# Patient Record
Sex: MALE | Race: WHITE | Employment: FULL TIME | URBAN - METROPOLITAN AREA
[De-identification: names, ages, dates, MRNs, and addresses within clinical notes are randomized per-mention and may not be internally consistent; named-entity substitution may affect disease eponyms.]

---

## 2014-05-01 LAB — COLONOSCOPY, EXTERNAL: NORMAL

## 2019-04-25 LAB — MICROALBUMIN UR TEST STR-MCNC: NORMAL MG/DL

## 2020-01-21 LAB — PSA, EXTERNAL: 2.2

## 2020-02-05 LAB
CREATININE, EXTERNAL: 1.12
HBA1C MFR BLD HPLC: 7.3 %
LDL-C, EXTERNAL: 95

## 2020-08-22 RX ORDER — ATORVASTATIN CALCIUM 10 MG/1
TABLET, FILM COATED ORAL
Qty: 90 TAB | Refills: 0 | Status: SHIPPED | OUTPATIENT
Start: 2020-08-22 | End: 2020-11-20

## 2020-08-22 NOTE — TELEPHONE ENCOUNTER
He was going to stop by in May for an A1c (non fasting) and was going to bring a copy of labs from his work physical.  Please set time for that nurse visit.

## 2020-09-01 DIAGNOSIS — E11.65 TYPE 2 DIABETES MELLITUS WITH HYPERGLYCEMIA, WITHOUT LONG-TERM CURRENT USE OF INSULIN (HCC): ICD-10-CM

## 2020-09-01 DIAGNOSIS — Z01.89 ROUTINE LAB DRAW: Primary | ICD-10-CM

## 2020-09-03 LAB
EST. AVERAGE GLUCOSE BLD GHB EST-MCNC: 140 MG/DL
HBA1C MFR BLD: 6.5 % (ref 4.8–5.6)

## 2020-10-21 RX ORDER — METFORMIN HYDROCHLORIDE 500 MG/1
TABLET, EXTENDED RELEASE ORAL
Qty: 270 TAB | Refills: 0 | Status: SHIPPED | OUTPATIENT
Start: 2020-10-21 | End: 2020-12-31

## 2020-10-22 VITALS
SYSTOLIC BLOOD PRESSURE: 118 MMHG | HEART RATE: 94 BPM | OXYGEN SATURATION: 96 % | DIASTOLIC BLOOD PRESSURE: 80 MMHG | WEIGHT: 162 LBS | BODY MASS INDEX: 24.55 KG/M2 | HEIGHT: 68 IN | RESPIRATION RATE: 12 BRPM | TEMPERATURE: 97.7 F

## 2020-10-22 RX ORDER — DULAGLUTIDE 0.75 MG/.5ML
0.75 INJECTION, SOLUTION SUBCUTANEOUS
COMMUNITY
End: 2020-11-26

## 2020-10-22 RX ORDER — BLOOD SUGAR DIAGNOSTIC
STRIP MISCELLANEOUS SEE ADMIN INSTRUCTIONS
COMMUNITY

## 2020-10-22 RX ORDER — ASPIRIN 81 MG/1
TABLET ORAL DAILY
COMMUNITY

## 2020-11-10 ENCOUNTER — VIRTUAL VISIT (OUTPATIENT)
Dept: FAMILY MEDICINE CLINIC | Age: 60
End: 2020-11-10
Payer: COMMERCIAL

## 2020-11-10 VITALS
OXYGEN SATURATION: 96 % | WEIGHT: 162 LBS | HEIGHT: 68 IN | RESPIRATION RATE: 12 BRPM | HEART RATE: 94 BPM | TEMPERATURE: 97.7 F | DIASTOLIC BLOOD PRESSURE: 80 MMHG | BODY MASS INDEX: 24.55 KG/M2 | SYSTOLIC BLOOD PRESSURE: 118 MMHG

## 2020-11-10 DIAGNOSIS — G47.09 OTHER INSOMNIA: ICD-10-CM

## 2020-11-10 DIAGNOSIS — E11.9 TYPE 2 DIABETES MELLITUS WITHOUT COMPLICATION, WITHOUT LONG-TERM CURRENT USE OF INSULIN (HCC): Primary | ICD-10-CM

## 2020-11-10 DIAGNOSIS — E78.2 MIXED HYPERLIPIDEMIA: ICD-10-CM

## 2020-11-10 PROCEDURE — 99214 OFFICE O/P EST MOD 30 MIN: CPT | Performed by: FAMILY MEDICINE

## 2020-11-10 NOTE — PROGRESS NOTES
Consent: Frankie Humphrey, who was seen by synchronous (real-time) audio-video technology, and/or his healthcare decision maker, is aware that this patient-initiated, Telehealth encounter on 11/10/2020 is a billable service, with coverage as determined by his insurance carrier. He is aware that he may receive a bill and has provided verbal consent to proceed: YES-Consent obtained within past 12 months        712  Subjective:   Frankie Humphrey is a 63 y.o. male who was seen for Follow-up (DM)      Patient is on the schedule today to follow-up on diabetes. He stopped by in September to have his A1c drawn and it was at 6.5%. He never did drop off of the results from his cholesterol testing that was done in March 2020 at his work physical.  He does report that he is exercising regularly. Home sugar readings are around 130150s. He denies any acute complaints today but continues to have problems with staying asleep at night. He has had these issues for many years. He was hoping that once he was on his CPAP machine that that would help him stay asleep longer but he has not found that to be the case. He falls asleep very easily but wakes up 3 to 4 hours later and has trouble falling back to sleep once his thoughts get churning. He has tried multiple over-the-counter medications. The only one that has been of any benefit is taking ZzzQuil when he wakes up during the night. He was put on trazodone in the past but had minimal benefit with that as well. Review of Systems   Constitutional: Negative for chills, fever and malaise/fatigue. Respiratory: Negative for cough, shortness of breath and wheezing. Cardiovascular: Positive for palpitations (Chronic. Unchanged. ). Negative for chest pain and leg swelling. Gastrointestinal: Negative for diarrhea and vomiting. Genitourinary: Negative for dysuria. Musculoskeletal: Negative for falls. Skin: Negative for rash.    Neurological: Negative for dizziness, tingling and weakness. Psychiatric/Behavioral: Negative for depression. The patient is not nervous/anxious. Prior to Admission medications    Medication Sig Start Date End Date Taking? Authorizing Provider   aspirin delayed-release 81 mg tablet Take  by mouth daily. Yes Provider, Historical   glucose blood VI test strips (OneTouch Verio test strips) strip by Does Not Apply route See Admin Instructions. Yes Provider, Historical   dulaglutide (Trulicity) 6.98 EV/6.5 mL sub-q pen 0.75 mg by SubCUTAneous route every seven (7) days. Yes Provider, Historical   metFORMIN ER (GLUCOPHAGE XR) 500 mg tablet TAKE 3 TABLETS BY MOUTH DAILY 10/21/20  Yes Lidia Grubbs MD   atorvastatin (LIPITOR) 10 mg tablet TAKE 1 TABLET BY MOUTH EVERY DAY AT BEDTIME 8/22/20  Yes Lidia Grubbs MD     No Known Allergies  Patient Active Problem List    Diagnosis    GERD (gastroesophageal reflux disease)    Basal cell carcinoma    Insomnia    Mixed hyperlipidemia    Obstructive sleep apnea    Type 2 diabetes mellitus without complication, without long-term current use of insulin ([facility] [address]       Objective:   Vital Signs: (As obtained by patient/caregiver at home)  There were no vitals taken for this visit.      [INSTRUCTIONS:  \"[x]\" Indicates a positive item  \"[]\" Indicates a negative item  -- DELETE ALL ITEMS NOT EXAMINED]    Constitutional: [x] Appears well-developed and well-nourished [x] No apparent distress      [] Abnormal -     Mental status: [x] Alert and awake  [x] Oriented to person/place/time [x] Able to follow commands    [] Abnormal -     Eyes:   EOM    [x]  Normal    [] Abnormal -   Sclera  [x]  Normal    [] Abnormal -          Discharge [x]  None visible   [] Abnormal -     HENT: [x] Normocephalic, atraumatic  [] Abnormal -   [x] Mouth/Throat: Mucous membranes are moist    External Ears [x] Normal  [] Abnormal -    Neck: [x] No visualized mass [] Abnormal -     Pulmonary/Chest: [x] Respiratory effort normal [x] No visualized signs of difficulty breathing or respiratory distress        [] Abnormal -        Neurological:        [x] No Facial Asymmetry (Cranial nerve 7 motor function) (limited exam due to video visit)          [x] No gaze palsy        [] Abnormal -          Skin:        [x] No significant exanthematous lesions or discoloration noted on facial skin         [] Abnormal -            Psychiatric:       [x] Normal Affect [] Abnormal -        [x] No Hallucinations    Other pertinent observable physical exam findings:-              Assessment & Plan:   Diagnoses and all orders for this visit:    1. Type 2 diabetes mellitus without complication, without long-term current use of insulin ([address]  Most recent A1c is at goal.  We will recheck this again in early 2021 when he is due for other labs. No medication change today. 2. Mixed hyperlipidemia  Patient reports that he had levels drawn through work physical in March 2020. He was never able to drop those off at the office but states he will do so he can. We will plan to see him early 2021 to recheck. 3. Other insomnia  We talked about various options for sleep. He is already tried and failed multiple over-the-counter options and trazodone. We reviewed that many of the next options would start getting into controlled substances that are potentially addicting. I had like him to first try some behavioral techniques and we reviewed some ways to distract his thoughts when he is trying to fall back to sleep. He is going to work on this and we can go up again in the future if not to goal.  Was      Follow-up and Dispositions    · Return in about 3 months (around 2/10/2021) for wellness and f/u chronic issues fasting. We discussed the expected course, resolution and complications of the diagnosis(es) in detail. Medication risks, benefits, costs, interactions, and alternatives were discussed as indicated.   I advised him to contact the office if his condition worsens, changes or fails to improve as anticipated. He expressed understanding with the diagnosis(es) and plan. Walker Nye is a 63 y.o. male being evaluated by a video visit encounter for concerns as above. A caregiver was present when appropriate. Due to this being a TeleHealth encounter (During [facility] public health emergency), evaluation of the following organ systems was limited: Vitals/Constitutional/EENT/Resp/CV/GI//MS/Neuro/Skin/Heme-Lymph-Imm. Pursuant to the emergency declaration under the [facility], [facility] waiver authority and the [employer] Act, this Virtual  Visit was conducted, with patient's (and/or legal guardian's) consent, to reduce the patient's risk of exposure to COVID-19 and provide necessary medical care. Services were provided through a video synchronous discussion virtually to substitute for in-person clinic visit. Patient and provider were located at their individual homes.         Nettie Mcbride MD

## 2020-11-20 RX ORDER — ATORVASTATIN CALCIUM 10 MG/1
TABLET, FILM COATED ORAL
Qty: 90 TAB | Refills: 0 | Status: SHIPPED | OUTPATIENT
Start: 2020-11-20 | End: 2021-02-16

## 2020-11-26 RX ORDER — DULAGLUTIDE 0.75 MG/.5ML
INJECTION, SOLUTION SUBCUTANEOUS
Qty: 6 ML | Refills: 2 | Status: SHIPPED | OUTPATIENT
Start: 2020-11-26 | End: 2021-08-24

## 2021-01-27 ENCOUNTER — OFFICE VISIT (OUTPATIENT)
Dept: FAMILY MEDICINE CLINIC | Age: 61
End: 2021-01-27
Payer: COMMERCIAL

## 2021-01-27 VITALS
WEIGHT: 168.38 LBS | BODY MASS INDEX: 25.6 KG/M2 | DIASTOLIC BLOOD PRESSURE: 62 MMHG | OXYGEN SATURATION: 96 % | HEART RATE: 81 BPM | SYSTOLIC BLOOD PRESSURE: 120 MMHG | TEMPERATURE: 97.5 F

## 2021-01-27 DIAGNOSIS — L20.82 FLEXURAL ECZEMA: ICD-10-CM

## 2021-01-27 DIAGNOSIS — E78.2 MIXED HYPERLIPIDEMIA: ICD-10-CM

## 2021-01-27 DIAGNOSIS — Z00.00 WELLNESS EXAMINATION: Primary | ICD-10-CM

## 2021-01-27 DIAGNOSIS — E11.9 TYPE 2 DIABETES MELLITUS WITHOUT COMPLICATION, WITHOUT LONG-TERM CURRENT USE OF INSULIN (HCC): ICD-10-CM

## 2021-01-27 DIAGNOSIS — Z12.5 SCREENING FOR PROSTATE CANCER: ICD-10-CM

## 2021-01-27 DIAGNOSIS — Z13.220 SCREENING FOR LIPOID DISORDERS: ICD-10-CM

## 2021-01-27 DIAGNOSIS — Z23 ENCOUNTER FOR IMMUNIZATION: ICD-10-CM

## 2021-01-27 DIAGNOSIS — L60.9 NAIL ABNORMALITIES: ICD-10-CM

## 2021-01-27 DIAGNOSIS — F51.01 PRIMARY INSOMNIA: ICD-10-CM

## 2021-01-27 PROCEDURE — 99214 OFFICE O/P EST MOD 30 MIN: CPT | Performed by: FAMILY MEDICINE

## 2021-01-27 PROCEDURE — 99396 PREV VISIT EST AGE 40-64: CPT | Performed by: FAMILY MEDICINE

## 2021-01-27 RX ORDER — ZOSTER VACCINE RECOMBINANT, ADJUVANTED 50 MCG/0.5
0.5 KIT INTRAMUSCULAR ONCE
Qty: 0.5 ML | Refills: 1 | Status: SHIPPED | OUTPATIENT
Start: 2021-01-27 | End: 2021-01-27

## 2021-01-27 RX ORDER — LISINOPRIL 2.5 MG/1
2.5 TABLET ORAL DAILY
Qty: 90 TAB | Refills: 1 | Status: SHIPPED | OUTPATIENT
Start: 2021-01-27 | End: 2021-07-25

## 2021-01-27 RX ORDER — TRIAMCINOLONE ACETONIDE 1 MG/G
OINTMENT TOPICAL 2 TIMES DAILY
Qty: 80 G | Refills: 0 | Status: SHIPPED | OUTPATIENT
Start: 2021-01-27 | End: 2021-06-14

## 2021-01-27 RX ORDER — DOXEPIN HYDROCHLORIDE 25 MG/1
CAPSULE ORAL
Qty: 60 CAP | Refills: 1 | Status: SHIPPED | OUTPATIENT
Start: 2021-01-27 | End: 2021-06-14

## 2021-01-27 NOTE — PATIENT INSTRUCTIONS
Atopic Dermatitis: Care Instructions Your Care Instructions Atopic dermatitis (also called eczema) is a skin problem that causes intense itching and a red, raised rash. In severe cases, the rash develops clear fluidfilled blisters. The rash is not contagious. People with this condition seem to have very sensitive immune systems that are likely to react to things that cause allergies. The immune system is the body's way of fighting infection. There is no cure for atopic dermatitis, but you may be able to control it with care at home. Follow-up care is a key part of your treatment and safety. Be sure to make and go to all appointments, and call your doctor if you are having problems. It's also a good idea to know your test results and keep a list of the medicines you take. How can you care for yourself at home? · Use moisturizer at least twice a day. · If your doctor prescribes a cream, use it as directed. If your doctor prescribes other medicine, take it exactly as directed. · Wash the affected area with water only. Soap can make dryness and itching worse. Pat dry. · Apply a moisturizer after bathing. Use a cream such as Lubriderm, Moisturel, or Cetaphil that does not irritate the skin or cause a rash. Apply the cream while your skin is still damp after lightly drying with a towel. · Use cold, wet cloths to reduce itching. · Keep cool, and stay out of the sun. · If itching affects your normal activities, an over-the-counter antihistamine, such as diphenhydramine (Benadryl) or loratadine (Claritin) may help. Read and follow all instructions on the label. When should you call for help? Call your doctor now or seek immediate medical care if: 
  · Your rash gets worse and you have a fever.  
  · You have new blisters or bruises, or the rash spreads and looks like a sunburn.  
  · You have signs of infection, such as: 
? Increased pain, swelling, warmth, or redness. ? Red streaks leading from the rash. ? Pus draining from the rash. ? A fever.  
  · You have crusting or oozing sores.  
  · You have joint aches or body aches along with your rash. Watch closely for changes in your health, and be sure to contact your doctor if: 
  · Your rash does not clear up after 2 to 3 weeks of home treatment.  
  · Itching interferes with your sleep or daily activities. Where can you learn more? Go to http://www.gray.com/ Enter Q408 in the search box to learn more about \"Atopic Dermatitis: Care Instructions. \" Current as of: July 2, 2020               Content Version: 12.6 © 1411-1373 [employer]. Care instructions adapted under license by [email or web address] (which disclaims liability or warranty for this information). If you have questions about a medical condition or this instruction, always ask your healthcare professional. [place] [address] any warranty or liability for your use of this information.

## 2021-01-27 NOTE — PROGRESS NOTES
Subjective  Chief Complaint   Patient presents with   • Annual Wellness Visit     HPI:  Greg Pratt is a 60 y.o. male.  Greg Pratt is on the schedule today for annual wellness exam and is also due for follow-up of chronic issues. he is willing to do both appointments today and realizes that there may be a co-pay for the follow-up portion of the visit.    For the wellness:  Flu- Recommended every fall  Tetanus- Tdap 2019  Shingrix- series not completed  [id number]- 2019  Prevnar 13- N/A  HCV screening- complete  PSA- 1/21/20 at 2.2  Colon cancer screening- colonoscopy 2014. Repeat 10 yrs  Smoking status- never  AAA screening- N/A  Low dose CT scan- N/A  PHQ2 Score = 0  Exercise- few times per week.  Would like to be more regular.    For the acute/chronic issues:  He is also following up on histories of diabetes and mixed hyperlipidemia.  He reports taking his medications as directed.  Only rarely checking sugars but has been reasonable in low-mid 100s when checking.  He does have a few acute issues/questions.  Firstly he is inquiring about toenail fungus that does not seem to be clearing on his own.  He has been dealing with this for years.  He also notes that he continues to have trouble sleeping at night.  He has tried trazodone, melatonin, and a few others but no real relief.  He would like to avoid controlled substance medications.  Finally, he notes rash on his hands.  There is some cracking with itching and pain associated.    Past Medical History:   Diagnosis Date   • Basal cell carcinoma    • Diabetes mellitus type 2, controlled (HCC)    • GERD (gastroesophageal reflux disease)    • Insomnia    • Mixed hyperlipidemia    • Obstructive sleep apnea      Family History   Problem Relation Age of Onset   • No Known Problems Mother    • No Known Problems Father      Social History     Socioeconomic History   • Marital status:      Spouse name: Not on file   • Number of children: Not on file   •  Years of education: Not on file    Highest education level: Not on file   Occupational History    Not on file   Social Needs    Financial resource strain: Not on file    Food insecurity     Worry: Not on file     Inability: Not on file    Transportation needs     Medical: Not on file     Non-medical: Not on file   Tobacco Use    Smoking status: Never Smoker    Smokeless tobacco: Never Used   Substance and Sexual Activity    Alcohol use: Yes    Drug use: Never    Sexual activity: Not on file   Lifestyle    Physical activity     Days per week: Not on file     Minutes per session: Not on file    Stress: Not on file   Relationships    Social connections     Talks on phone: Not on file     Gets together: Not on file     Attends Yazidism service: Not on file     Active member of club or organization: Not on file     Attends meetings of clubs or organizations: Not on file     Relationship status: Not on file    Intimate partner violence     Fear of current or ex partner: Not on file     Emotionally abused: Not on file     Physically abused: Not on file     Forced sexual activity: Not on file   Other Topics Concern    Not on file   Social History Narrative    Not on file     Current Outpatient Medications on File Prior to Visit   Medication Sig Dispense Refill    metFORMIN ER (GLUCOPHAGE XR) 500 mg tablet TAKE 3 TABLETS BY MOUTH DAILY (Patient taking differently: Take 2,000 mg by mouth daily.) 230 Tab 2    Trulicity 0.95 MH/7.6 mL sub-q pen ADMINISTER CONTENTS OF 1 PEN UNDER THE SKIN EVERY WEEK 6 mL 2    atorvastatin (LIPITOR) 10 mg tablet TAKE 1 TABLET BY MOUTH EVERY DAY AT BEDTIME 90 Tab 0    aspirin delayed-release 81 mg tablet Take  by mouth daily.  glucose blood VI test strips (OneTouch Verio test strips) strip by Does Not Apply route See Admin Instructions. No current facility-administered medications on file prior to visit.       No Known Allergies  Review of Systems   Constitutional: Negative for chills, fever and malaise/fatigue. Respiratory: Negative for cough, shortness of breath and wheezing. Cardiovascular: Negative for chest pain, palpitations and leg swelling. Gastrointestinal: Negative for diarrhea and vomiting. Genitourinary: Negative for dysuria. Neurological: Negative for dizziness, tingling and weakness. Psychiatric/Behavioral: Negative for depression. The patient is not nervous/anxious. Objective  Visit Vitals  /62 (BP 1 Location: Left arm, BP Patient Position: Sitting)   Pulse 81   Temp 97.5 °F (36.4 °C) (Temporal)   Wt 168 lb 6 oz (76.4 kg)   SpO2 96%   BMI 25.60 kg/m²     Physical Exam  Constitutional:       General: He is not in acute distress. Appearance: Normal appearance. He is normal weight. HENT:      Head: Normocephalic and atraumatic. Neck:      Musculoskeletal: Neck supple. No muscular tenderness. Thyroid: No thyroid mass or thyromegaly. Cardiovascular:      Rate and Rhythm: Normal rate and regular rhythm. Heart sounds: No murmur. Pulmonary:      Effort: Pulmonary effort is normal. No respiratory distress. Breath sounds: Normal breath sounds. No wheezing. Musculoskeletal:      Right lower leg: No edema. Left lower leg: No edema. Lymphadenopathy:      Cervical: No cervical adenopathy. Skin:     General: Skin is warm and dry. Comments: Bilateral hands with scattered areas of dry, cracked, flaking skin with an erythematous base. No drainage. Left great toenail with some slight yellowing and thickening only along the medial edge. The rest of the nail appears healthy and normal.   Neurological:      Mental Status: He is alert and oriented to person, place, and time. Mental status is at baseline.    Psychiatric:         Attention and Perception: Attention and perception normal.         Mood and Affect: Mood and affect normal.         Speech: Speech normal.         Behavior: Behavior normal. Assessment & Plan    ICD-10-CM ICD-9-CM    1. Wellness examination  Z00.00 V70.0    2. Encounter for immunization  Z23 V03.89 varicella-zoster recombinant, PF, (Shingrix, PF,) 50 mcg/0.5 mL susr injection   3. Screening for lipoid disorders  Z13.220 V77.91 LIPID PANEL      METABOLIC PANEL, COMPREHENSIVE      CBC WITH AUTOMATED DIFF   4. Screening for prostate cancer  Z12.5 V76.44 PSA W/ REFLX FREE PSA   5. Type 2 diabetes mellitus without complication, without long-term current use of insulin (HCC)  E11.9 250.00 HEMOGLOBIN A1C WITH EAG      MICROALBUMIN, UR, RAND W/ MICROALB/CREAT RATIO      lisinopriL (PRINIVIL, ZESTRIL) 2.5 mg tablet   6. Mixed hyperlipidemia  E78.2 272.2    7. Flexural eczema  L20.82 691.8 triamcinolone acetonide (KENALOG) 0.1 % ointment   8. Primary insomnia  F51.01 307.42    9. Nail abnormalities  L60.9 703.9      Diagnoses and all orders for this visit:    1. Wellness examination  We are getting patient up to date on recommended preventative measures as noted. 2. Encounter for immunization  -     varicella-zoster recombinant, PF, (Shingrix, PF,) 50 mcg/0.5 mL susr injection; 0.5 mL by IntraMUSCular route once for 1 dose. 0.5ml IM. Repeat in -6 mos. Patient instructed to go to pharmacy for Shingrix series. 3. Screening for lipoid disorders  -     LIPID PANEL  -     METABOLIC PANEL, COMPREHENSIVE  -     CBC WITH AUTOMATED DIFF    4. Screening for prostate cancer  -     PSA W/ REFLX FREE PSA  January 2020 PSA level at 2.2. We are checking with levels today. 5. Type 2 diabetes mellitus without complication, without long-term current use of insulin (HCC)  -     HEMOGLOBIN A1C WITH EAG  -     MICROALBUMIN, UR, RAND W/ MICROALB/CREAT RATIO  -     lisinopriL (PRINIVIL, ZESTRIL) 2.5 mg tablet; Take 1 Tab by mouth daily. We are checking labs as listed and will add a low-dose of lisinopril for renal protection's by 88 guidelines.     6. Mixed hyperlipidemia  Patient reports taking medication daily as directed. We are checking annual level with labs. 7. Flexural eczema  -     triamcinolone acetonide (KENALOG) 0.1 % ointment; Apply  to affected area two (2) times a day. use thin layer  We reviewed the importance of regular moisturization especially after getting hands wet. Patient notes that this makes sense as he has been washing his hands much more than usual.  Triamcinolone ointment for flareups. 8. Primary insomnia  We will try doxepin to see if that gives him some relief while being able to avoid the controlled medications. 9. Nail abnormalities  His current nail changes appear more consistent with microtrauma as opposed to fungal etiology. He does wear steel toed boots. I recommend trying going up by a half size when he replaces them. Be sure to wear shoes with a wide toe box that does not rub against that big toe. Other orders  -     doxepin (SINEquan) 25 mg capsule; Take 1-2 tab po qhs prn sleep      Follow-up and Dispositions    · Return in about 6 months (around 7/27/2021) for f/u DM. non fasting.        Ruther Skiff, MD

## 2021-01-28 LAB
ALBUMIN SERPL-MCNC: 4.8 G/DL (ref 3.8–4.8)
ALBUMIN/CREAT UR: 29 MG/G CREAT (ref 0–29)
ALBUMIN/GLOB SERPL: 1.9 {RATIO} (ref 1.2–2.2)
ALP SERPL-CCNC: 67 IU/L (ref 39–117)
ALT SERPL-CCNC: 14 IU/L (ref 0–44)
AST SERPL-CCNC: 17 IU/L (ref 0–40)
BASOPHILS # BLD AUTO: 0.1 X10E3/UL (ref 0–0.2)
BASOPHILS NFR BLD AUTO: 1 %
BILIRUB SERPL-MCNC: 0.2 MG/DL (ref 0–1.2)
BUN SERPL-MCNC: 15 MG/DL (ref 8–27)
BUN/CREAT SERPL: 14 (ref 10–24)
CALCIUM SERPL-MCNC: 9.6 MG/DL (ref 8.6–10.2)
CHLORIDE SERPL-SCNC: 102 MMOL/L (ref 96–106)
CHOLEST SERPL-MCNC: 154 MG/DL (ref 100–199)
CO2 SERPL-SCNC: 24 MMOL/L (ref 20–29)
CREAT SERPL-MCNC: 1.11 MG/DL (ref 0.76–1.27)
CREAT UR-MCNC: 182.7 MG/DL
EOSINOPHIL # BLD AUTO: 0.2 X10E3/UL (ref 0–0.4)
EOSINOPHIL NFR BLD AUTO: 3 %
ERYTHROCYTE [DISTWIDTH] IN BLOOD BY AUTOMATED COUNT: 12.8 % (ref 11.6–15.4)
EST. AVERAGE GLUCOSE BLD GHB EST-MCNC: 148 MG/DL
GLOBULIN SER CALC-MCNC: 2.5 G/DL (ref 1.5–4.5)
GLUCOSE SERPL-MCNC: 124 MG/DL (ref 65–99)
HBA1C MFR BLD: 6.8 % (ref 4.8–5.6)
HCT VFR BLD AUTO: 42 % (ref 37.5–51)
HDLC SERPL-MCNC: 58 MG/DL
HGB BLD-MCNC: 13.7 G/DL (ref 13–17.7)
IMM GRANULOCYTES # BLD AUTO: 0 X10E3/UL (ref 0–0.1)
IMM GRANULOCYTES NFR BLD AUTO: 0 %
LDLC SERPL CALC-MCNC: 83 MG/DL (ref 0–99)
LYMPHOCYTES # BLD AUTO: 2.1 X10E3/UL (ref 0.7–3.1)
LYMPHOCYTES NFR BLD AUTO: 27 %
MCH RBC QN AUTO: 29.8 PG (ref 26.6–33)
MCHC RBC AUTO-ENTMCNC: 32.6 G/DL (ref 31.5–35.7)
MCV RBC AUTO: 91 FL (ref 79–97)
MICROALBUMIN UR-MCNC: 52.5 UG/ML
MONOCYTES # BLD AUTO: 0.7 X10E3/UL (ref 0.1–0.9)
MONOCYTES NFR BLD AUTO: 9 %
NEUTROPHILS # BLD AUTO: 4.6 X10E3/UL (ref 1.4–7)
NEUTROPHILS NFR BLD AUTO: 60 %
PLATELET # BLD AUTO: 316 X10E3/UL (ref 150–450)
POTASSIUM SERPL-SCNC: 5 MMOL/L (ref 3.5–5.2)
PROT SERPL-MCNC: 7.3 G/DL (ref 6–8.5)
PSA SERPL-MCNC: 2.7 NG/ML (ref 0–4)
RBC # BLD AUTO: 4.6 X10E6/UL (ref 4.14–5.8)
REFLEX CRITERIA: NORMAL
SODIUM SERPL-SCNC: 140 MMOL/L (ref 134–144)
TRIGL SERPL-MCNC: 67 MG/DL (ref 0–149)
VLDLC SERPL CALC-MCNC: 13 MG/DL (ref 5–40)
WBC # BLD AUTO: 7.7 X10E3/UL (ref 3.4–10.8)

## 2021-06-14 ENCOUNTER — OFFICE VISIT (OUTPATIENT)
Dept: FAMILY MEDICINE CLINIC | Age: 61
End: 2021-06-14
Payer: COMMERCIAL

## 2021-06-14 VITALS
HEART RATE: 86 BPM | DIASTOLIC BLOOD PRESSURE: 80 MMHG | TEMPERATURE: 97.5 F | WEIGHT: 164.4 LBS | OXYGEN SATURATION: 96 % | BODY MASS INDEX: 24.92 KG/M2 | RESPIRATION RATE: 14 BRPM | HEIGHT: 68 IN | SYSTOLIC BLOOD PRESSURE: 112 MMHG

## 2021-06-14 DIAGNOSIS — Z23 NEED FOR COVID-19 VACCINE: ICD-10-CM

## 2021-06-14 DIAGNOSIS — R05.8 ALLERGIC COUGH: ICD-10-CM

## 2021-06-14 DIAGNOSIS — R05.8 COUGH PRESENT FOR GREATER THAN 3 WEEKS: Primary | ICD-10-CM

## 2021-06-14 PROCEDURE — 99213 OFFICE O/P EST LOW 20 MIN: CPT | Performed by: NURSE PRACTITIONER

## 2021-06-14 RX ORDER — CETIRIZINE HCL 10 MG
10 TABLET ORAL
Qty: 30 TABLET | Refills: 0 | Status: SHIPPED | OUTPATIENT
Start: 2021-06-14 | End: 2021-08-02

## 2021-06-14 NOTE — PATIENT INSTRUCTIONS
Cough: Care Instructions Your Care Instructions A cough is your body's response to something that bothers your throat or airways. Many things can cause a cough. You might cough because of a cold or the flu, bronchitis, or asthma. Smoking, postnasal drip, allergies, and stomach acid that backs up into your throat also can cause coughs. A cough is a symptom, not a disease. Most coughs stop when the cause, such as a cold, goes away. You can take a few steps at home to cough less and feel better. Follow-up care is a key part of your treatment and safety. Be sure to make and go to all appointments, and call your doctor if you are having problems. It's also a good idea to know your test results and keep a list of the medicines you take. How can you care for yourself at home? · Drink lots of water and other fluids. This helps thin the mucus and soothes a dry or sore throat. Honey or lemon juice in hot water or tea may ease a dry cough. · Take cough medicine as directed by your doctor. · Prop up your head on pillows to help you breathe and ease a dry cough. · Try cough drops to soothe a dry or sore throat. Cough drops don't stop a cough. Medicine-flavored cough drops are no better than candy-flavored drops or hard candy. · Do not smoke. Avoid secondhand smoke. If you need help quitting, talk to your doctor about stop-smoking programs and medicines. These can increase your chances of quitting for good. When should you call for help? Call 911 anytime you think you may need emergency care. For example, call if: 
  · You have severe trouble breathing. Call your doctor now or seek immediate medical care if: 
  · You cough up blood.  
  · You have new or worse trouble breathing.  
  · You have a new or higher fever.  
  · You have a new rash.   
Watch closely for changes in your health, and be sure to contact your doctor if: 
  · You cough more deeply or more often, especially if you notice more mucus or a change in the color of your mucus.  
  · You have new symptoms, such as a sore throat, an earache, or sinus pain.  
  · You do not get better as expected. Where can you learn more? Go to http://www.gray.com/ Enter D279 in the search box to learn more about \"Cough: Care Instructions. \" Current as of: October 26, 2020               Content Version: 12.8 © 2006-2021 [employer]. Care instructions adapted under license by [employer] (which disclaims liability or warranty for this information). If you have questions about a medical condition or this instruction, always ask your healthcare professional. [address] any warranty or liability for your use of this information.

## 2021-06-14 NOTE — PROGRESS NOTES
Subjective  Chief Complaint   Patient presents with    Cold Symptoms     HPI:  Shmuel Reyna is a 63 y.o. male. Cough since mid April. Has been having every day coughing fits, regardless of time, although he notes the coughing can keep him up at night. Coughing spells last 15-20 mins, every 3-4 hours throughout the day. He has hx of GERD (briefly on meds for a few years than stopped when ran out), had Covid mid feb; and hx of sleep apnea (hasn't used his cpap machine in a month due to his cough). He also notes growing up in [facility] severe hay fever, moved to [facility] and problems subsided. He has had no issues with allergies since moving to [place] 6 years ago. No sick contacts,  No covid contacts. No associated symptoms consistently. For the month of may when he was weed whacking in the yard made it worse. Using cough drops and cough medicine OTC as needed which helps but has not provided long term resolution. Denies itchy/watery eyes, change in hearing/ear pain, sob, fever, chills, body-aches. Objective  Visit Vitals  /80 (BP 1 Location: Left upper arm, BP Patient Position: Sitting)   Pulse 86   Temp 97.5 °F (36.4 °C) (Temporal)   Resp 14   Ht 5' 8\" (1.727 m)   Wt 164 lb 6.4 oz (74.6 kg)   SpO2 96%   BMI 25.00 kg/m²     Physical Exam  Constitutional:       Appearance: Normal appearance. He is normal weight. HENT:      Head: Normocephalic. Right Ear: Tympanic membrane, ear canal and external ear normal.      Left Ear: Tympanic membrane, ear canal and external ear normal.      Mouth/Throat:      Mouth: Mucous membranes are moist.   Cardiovascular:      Rate and Rhythm: Normal rate and regular rhythm. Pulmonary:      Effort: Pulmonary effort is normal.      Breath sounds: Normal breath sounds. Neurological:      Mental Status: He is alert. Psychiatric:         Mood and Affect: Mood normal.         Behavior: Behavior normal.         Thought Content:  Thought content normal.         Judgment: Judgment normal.          Assessment & Plan    Diagnoses and all orders for this visit:    1. Cough present for greater than 3 weeks  -     cetirizine (ZYRTEC) 10 mg tablet; Take 1 Tablet by mouth nightly. Indications: inflammation of the nose due to an allergy    2. Allergic cough    3. Need for COVID-19 vaccine    based on HPI, timing with change of season and triggered with lawn care is suggestive of allergic cough. Recommended trial with 2nd gen antihistamines at night. Differentials include GERD, asthma. No need for covid 19 testing as >10 days from symptoms onset.          Minh Gerard NP

## 2021-07-24 DIAGNOSIS — E11.9 TYPE 2 DIABETES MELLITUS WITHOUT COMPLICATION, WITHOUT LONG-TERM CURRENT USE OF INSULIN (HCC): ICD-10-CM

## 2021-07-25 RX ORDER — LISINOPRIL 2.5 MG/1
TABLET ORAL
Qty: 90 TABLET | Refills: 1 | Status: SHIPPED | OUTPATIENT
Start: 2021-07-25 | End: 2021-09-10 | Stop reason: SINTOL

## 2021-08-02 ENCOUNTER — OFFICE VISIT (OUTPATIENT)
Dept: FAMILY MEDICINE CLINIC | Age: 61
End: 2021-08-02
Payer: COMMERCIAL

## 2021-08-02 VITALS
HEIGHT: 68 IN | HEART RATE: 87 BPM | TEMPERATURE: 97.7 F | RESPIRATION RATE: 16 BRPM | WEIGHT: 164.2 LBS | OXYGEN SATURATION: 97 % | BODY MASS INDEX: 24.89 KG/M2 | SYSTOLIC BLOOD PRESSURE: 110 MMHG | DIASTOLIC BLOOD PRESSURE: 82 MMHG

## 2021-08-02 DIAGNOSIS — R05.3 CHRONIC COUGH: ICD-10-CM

## 2021-08-02 DIAGNOSIS — E11.9 TYPE 2 DIABETES MELLITUS WITHOUT COMPLICATION, WITHOUT LONG-TERM CURRENT USE OF INSULIN (HCC): Primary | ICD-10-CM

## 2021-08-02 DIAGNOSIS — K21.9 GASTROESOPHAGEAL REFLUX DISEASE WITHOUT ESOPHAGITIS: ICD-10-CM

## 2021-08-02 LAB — HBA1C MFR BLD HPLC: 7.2 %

## 2021-08-02 PROCEDURE — 83036 HEMOGLOBIN GLYCOSYLATED A1C: CPT | Performed by: FAMILY MEDICINE

## 2021-08-02 PROCEDURE — 99214 OFFICE O/P EST MOD 30 MIN: CPT | Performed by: FAMILY MEDICINE

## 2021-08-02 PROCEDURE — 3051F HG A1C>EQUAL 7.0%<8.0%: CPT | Performed by: FAMILY MEDICINE

## 2021-08-02 RX ORDER — PHENOL/SODIUM PHENOLATE
AEROSOL, SPRAY (ML) MUCOUS MEMBRANE
Qty: 90 TABLET | Refills: 0 | Status: SHIPPED | OUTPATIENT
Start: 2021-08-02 | End: 2022-02-01 | Stop reason: ALTCHOICE

## 2021-08-02 RX ORDER — DOXEPIN HYDROCHLORIDE 25 MG/1
CAPSULE ORAL
COMMUNITY
Start: 2021-07-26 | End: 2021-08-24

## 2021-08-02 NOTE — PROGRESS NOTES
Chief Complaint   Patient presents with    Follow Up Chronic Condition     dm, gerd     1. Have you been to the ER, urgent care clinic since your last visit? Hospitalized since your last visit? No    2. Have you seen or consulted any other health care providers outside of the [address] [place] [address] since your last visit? Include any pap smears or colon screening.  No     Visit Vitals  /82 (BP 1 Location: Left upper arm, BP Patient Position: Sitting, BP Cuff Size: Adult)   Pulse 87   Temp 97.7 °F (36.5 °C) (Temporal)   Resp 16   Ht 5' 8\" (1.727 m)   Wt 164 lb 3.2 oz (74.5 kg)   SpO2 97%   BMI 24.97 kg/m²     3 most recent PHQ Screens 8/2/2021   Little interest or pleasure in doing things Not at all   Feeling down, depressed, irritable, or hopeless Not at all   Total Score PHQ 2 0

## 2021-08-02 NOTE — PROGRESS NOTES
Subjective  Chief Complaint   Patient presents with    Follow Up Chronic Condition     dm, gerd     HPI:  Rubenminoo Stockton is a 63 y.o. male. He is here today to follow-up on diabetes and also to discuss the cough that he has had since April. For the diabetes he reports taking his medications as directed but has not been as faithful to diet and exercise improvements. He does not check home sugar readings often but when he does they are typically around 140170. As for the cough, he notes that this has been ongoing since April. He does admit that he had COVID-19 in January but did not have any cough at that time. He has a history of reflux but has not been feeling heartburn. The cough occurs about every 20 minutes and leads to mucus buildup in his chest.  It is worse at night. Not affected by foods or laying flat. No swelling, wheezing, or shortness of breath. No improvement in symptoms during trial of Zyrtec. Past Medical History:   Diagnosis Date    Basal cell carcinoma     Diabetes mellitus type 2, controlled (HCC)     GERD (gastroesophageal reflux disease)     Insomnia     Mixed hyperlipidemia     Obstructive sleep apnea      Family History   Problem Relation Age of Onset    No Known Problems Mother     No Known Problems Father      Social History     Socioeconomic History    Marital status:      Spouse name: Not on file    Number of children: Not on file    Years of education: Not on file    Highest education level: Not on file   Occupational History    Not on file   Tobacco Use    Smoking status: Never Smoker    Smokeless tobacco: Never Used   Vaping Use    Vaping Use: Never used   Substance and Sexual Activity    Alcohol use:  Yes    Drug use: Never    Sexual activity: Not on file   Other Topics Concern    Not on file   Social History Narrative    Not on file     Social Determinants of Health     Financial Resource Strain:     Difficulty of Paying Living Expenses: Food Insecurity:     Worried About Running Out of Food in the Last Year:     [address] in the Last Year:    Transportation Needs:     Lack of Transportation (Medical):  Lack of Transportation (Non-Medical):    Physical Activity:     Days of Exercise per Week:     Minutes of Exercise per Session:    Stress:     Feeling of Stress :    Social Connections:     Frequency of Communication with Friends and Family:     Frequency of Social Gatherings with Friends and Family:     Attends Scientologist Services:     Active Member of Clubs or Organizations:     Attends Club or Organization Meetings:     Marital Status:    Intimate Partner Violence:     Fear of Current or Ex-Partner:     Emotionally Abused:     Physically Abused:     Sexually Abused:      Current Outpatient Medications on File Prior to Visit   Medication Sig Dispense Refill    doxepin (SINEquan) 25 mg capsule TAKE 1 TO 2 CAPSULES BY MOUTH EVERY NIGHT AT BEDTIME AS NEEDED FOR SLEEP      lisinopriL (PRINIVIL, ZESTRIL) 2.5 mg tablet TAKE 1 TABLET BY MOUTH DAILY 90 Tablet 1    atorvastatin (LIPITOR) 10 mg tablet TAKE 1 TABLET BY MOUTH EVERY DAY AT BEDTIME 90 Tab 3    metFORMIN ER (GLUCOPHAGE XR) 500 mg tablet TAKE 3 TABLETS BY MOUTH DAILY (Patient taking differently: Take 1,500 mg by mouth daily.) 502 Tab 2    Trulicity 1.63 KA/4.8 mL sub-q pen ADMINISTER CONTENTS OF 1 PEN UNDER THE SKIN EVERY WEEK 6 mL 2    aspirin delayed-release 81 mg tablet Take  by mouth daily.  glucose blood VI test strips (OneTouch Verio test strips) strip by Does Not Apply route See Admin Instructions.  [DISCONTINUED] cetirizine (ZYRTEC) 10 mg tablet Take 1 Tablet by mouth nightly. Indications: inflammation of the nose due to an allergy (Patient not taking: Reported on 8/2/2021) 30 Tablet 0     No current facility-administered medications on file prior to visit.      No Known Allergies  Review of Systems   Constitutional: Negative for chills, fever and malaise/fatigue. Respiratory: Positive for cough. Negative for shortness of breath and wheezing. Cardiovascular: Negative for chest pain, palpitations and leg swelling. Gastrointestinal: Negative for diarrhea and vomiting. Genitourinary: Negative for dysuria. Neurological: Negative for dizziness, tingling and weakness. Psychiatric/Behavioral: Negative for depression. The patient is not nervous/anxious. Objective  Visit Vitals  /82 (BP 1 Location: Left upper arm, BP Patient Position: Sitting, BP Cuff Size: Adult)   Pulse 87   Temp 97.7 °F (36.5 °C) (Temporal)   Resp 16   Ht 5' 8\" (1.727 m)   Wt 164 lb 3.2 oz (74.5 kg)   SpO2 97%   BMI 24.97 kg/m²     Physical Exam  Constitutional:       General: He is not in acute distress. Appearance: Normal appearance. He is normal weight. HENT:      Head: Normocephalic and atraumatic. Neck:      Thyroid: No thyroid mass or thyromegaly. Cardiovascular:      Rate and Rhythm: Normal rate and regular rhythm. Heart sounds: No murmur heard. Pulmonary:      Effort: Pulmonary effort is normal. No respiratory distress. Breath sounds: Normal breath sounds. No wheezing. Musculoskeletal:      Cervical back: Neck supple. No muscular tenderness. Right lower leg: No edema. Left lower leg: No edema. Lymphadenopathy:      Cervical: No cervical adenopathy. Skin:     General: Skin is warm and dry. Neurological:      Mental Status: He is alert and oriented to person, place, and time. Mental status is at baseline. Psychiatric:         Attention and Perception: Attention and perception normal.         Mood and Affect: Mood and affect normal.         Speech: Speech normal.         Behavior: Behavior normal.          Assessment & Plan    ICD-10-CM ICD-9-CM    1. Type 2 diabetes mellitus without complication, without long-term current use of insulin ([facility])  E11.9 250.00 AMB POC HEMOGLOBIN A1C   2.  Gastroesophageal reflux disease without esophagitis  K21.9 530.81    3. Chronic cough  R05 786.2 Omeprazole delayed release (PRILOSEC D/R) 20 mg tablet     Diagnoses and all orders for this visit:    1. Type 2 diabetes mellitus without complication, without long-term current use of insulin ([facility])  -     AMB POC HEMOGLOBIN A1C  Results for orders placed or performed in visit on 08/02/21   AMB POC HEMOGLOBIN A1C   Result Value Ref Range    Hemoglobin A1c (POC) 7.2 %     We reviewed that his A1c is just above goal today. He would like a chance to get back into better diet and exercise habits as opposed to increasing the Trulicity dose at this time. 2. Gastroesophageal reflux disease without esophagitis  Not experiencing any specific heartburn but if the cough does not resolve with discontinuing lisinopril, he is going to be do a trial of omeprazole. 3. Chronic cough  -     Omeprazole delayed release (PRILOSEC D/R) 20 mg tablet; One tab po daily x 4 wks then prn  Discontinue lisinopril. If after 1 week he is not showing any improvement in the cough he will try omeprazole. If still not showing improvement he will contact the office with an update. At that time we may try Flonase or can consider pulmonology versus ENT referral.  I did review the chest x-ray ordered by Dr. Addy León. It was within normal limits. Follow-up and Dispositions    · Return in about 6 months (around 2/2/2022) for wellness, fasting f/u.        Silverio Stoll MD

## 2021-08-24 RX ORDER — DULAGLUTIDE 0.75 MG/.5ML
INJECTION, SOLUTION SUBCUTANEOUS
Qty: 6 ML | Refills: 2 | Status: SHIPPED | OUTPATIENT
Start: 2021-08-24 | End: 2022-02-25

## 2021-09-10 ENCOUNTER — PATIENT MESSAGE (OUTPATIENT)
Dept: FAMILY MEDICINE CLINIC | Age: 61
End: 2021-09-10

## 2021-09-10 NOTE — TELEPHONE ENCOUNTER
From: Riley Lyons  To: Vernon Gordon MD  Sent: 9/10/2021 6:51 AM EDT  Subject: Non-Urgent Medical Question    My cough has disappeared. Must have been the lisinopril.

## 2021-09-14 RX ORDER — METFORMIN HYDROCHLORIDE 500 MG/1
TABLET, EXTENDED RELEASE ORAL
Qty: 270 TABLET | Refills: 2 | Status: SHIPPED | OUTPATIENT
Start: 2021-09-14 | End: 2022-03-22

## 2022-02-01 ENCOUNTER — OFFICE VISIT (OUTPATIENT)
Dept: FAMILY MEDICINE CLINIC | Age: 62
End: 2022-02-01
Payer: COMMERCIAL

## 2022-02-01 VITALS
WEIGHT: 166 LBS | SYSTOLIC BLOOD PRESSURE: 118 MMHG | TEMPERATURE: 97.5 F | DIASTOLIC BLOOD PRESSURE: 72 MMHG | HEART RATE: 78 BPM | HEIGHT: 68 IN | BODY MASS INDEX: 25.16 KG/M2 | OXYGEN SATURATION: 95 %

## 2022-02-01 DIAGNOSIS — Z13.31 DEPRESSION SCREENING: ICD-10-CM

## 2022-02-01 DIAGNOSIS — Z13.220 SCREENING FOR LIPOID DISORDERS: ICD-10-CM

## 2022-02-01 DIAGNOSIS — Z12.5 SCREENING FOR PROSTATE CANCER: ICD-10-CM

## 2022-02-01 DIAGNOSIS — Z00.00 WELLNESS EXAMINATION: Primary | ICD-10-CM

## 2022-02-01 DIAGNOSIS — Z23 ENCOUNTER FOR IMMUNIZATION: ICD-10-CM

## 2022-02-01 DIAGNOSIS — E78.2 MIXED HYPERLIPIDEMIA: ICD-10-CM

## 2022-02-01 DIAGNOSIS — E11.9 TYPE 2 DIABETES MELLITUS WITHOUT COMPLICATION, WITHOUT LONG-TERM CURRENT USE OF INSULIN (HCC): ICD-10-CM

## 2022-02-01 PROCEDURE — 99396 PREV VISIT EST AGE 40-64: CPT | Performed by: FAMILY MEDICINE

## 2022-02-01 PROCEDURE — 99214 OFFICE O/P EST MOD 30 MIN: CPT | Performed by: FAMILY MEDICINE

## 2022-02-01 NOTE — PROGRESS NOTES
Subjective  Chief Complaint   Patient presents with    Annual Wellness Visit     HPI:  Andrea Morgan is a 57 y.o. male. Andrea Morgan is on the schedule today for annual wellness exam and is also due for follow-up of chronic issues. he is willing to do both appointments today and realizes that there may be a co-pay for the follow-up portion of the visit. For the wellness:  Flu vaccine- Recommended every fall  COVID vaccine series- complete  Tetanus- Tdap 2019  Shingrix- series not completed  Pneumovax 23- 2019  Prevnar 15- at age 71  HCV screening- complete  PSA- to be done today  Colon cancer screening- colonoscopy 5/1/14. Repeat 10 yrs. Smoking status- never  AAA screening- N/A  Low dose CT scan- N/A  PHQ2 Score = 0  Exercise- increasing this recently to be more consistent    For the acute/chronic issues:  He is also following up today on histories of diabetes and hyperlipidemia. He notes that his home blood sugars tend to run higher in the mornings than they do later in the day. In the mornings he can see around 160 but is often lower throughout the day. He does report taking medications as directed. He is fasting today for labs. Notes that at home they have started eating a bit healthier due to his wife having some recent chest pain issues and trying to eat healthier for that history. He is also starting to get back into being more consistent with his exercise once again.     Past Medical History:   Diagnosis Date    Basal cell carcinoma     Diabetes mellitus type 2, controlled (HCC)     GERD (gastroesophageal reflux disease)     Insomnia     Mixed hyperlipidemia     Obstructive sleep apnea      Family History   Problem Relation Age of Onset    No Known Problems Mother     No Known Problems Father      Social History     Socioeconomic History    Marital status:      Spouse name: Not on file    Number of children: Not on file    Years of education: Not on file    Highest education level: Not on file   Occupational History    Not on file   Tobacco Use    Smoking status: Never Smoker    Smokeless tobacco: Never Used   Vaping Use    Vaping Use: Never used   Substance and Sexual Activity    Alcohol use: Yes    Drug use: Never    Sexual activity: Not on file   Other Topics Concern    Not on file   Social History Narrative    Not on file     Social Determinants of Health     Financial Resource Strain:     Difficulty of Paying Living Expenses: Not on file   Food Insecurity:     Worried About Running Out of Food in the Last Year: Not on file    Zac of Food in the Last Year: Not on file   Transportation Needs:     Lack of Transportation (Medical): Not on file    Lack of Transportation (Non-Medical):  Not on file   Physical Activity:     Days of Exercise per Week: Not on file    Minutes of Exercise per Session: Not on file   Stress:     Feeling of Stress : Not on file   Social Connections:     Frequency of Communication with Friends and Family: Not on file    Frequency of Social Gatherings with Friends and Family: Not on file    Attends Pentecostal Services: Not on file    Active Member of [address] [place] [address] or Organizations: Not on file    Attends Club or Organization Meetings: Not on file    Marital Status: Not on file   Intimate Partner Violence:     Fear of Current or Ex-Partner: Not on file    Emotionally Abused: Not on file    Physically Abused: Not on file    Sexually Abused: Not on file   Housing Stability:     Unable to Pay for Housing in the Last Year: Not on file    Number of [place] in the Last Year: Not on file    Unstable Housing in the Last Year: Not on file     Current Outpatient Medications on File Prior to Visit   Medication Sig Dispense Refill    metFORMIN ER (GLUCOPHAGE XR) 500 mg tablet TAKE 3 TABLETS BY MOUTH DAILY 270 Tablet 2    Trulicity 5.80 SK/8.3 mL sub-q pen ADMINISTER CONTENTS OF 1 PEN UNDER THE SKIN EVERY WEEK 6 mL 2    atorvastatin (LIPITOR) 10 mg tablet TAKE 1 TABLET BY MOUTH EVERY DAY AT BEDTIME 90 Tab 3    aspirin delayed-release 81 mg tablet Take  by mouth daily.  glucose blood VI test strips (OneTouch Verio test strips) strip by Does Not Apply route See Admin Instructions.  doxepin (SINEquan) 25 mg capsule TAKE 1 TO 2 CAPSULES BY MOUTH EVERY NIGHT AT BEDTIME AS NEEDED FOR SLEEP (Patient not taking: Reported on 2/1/2022) 180 Capsule 2    [DISCONTINUED] Omeprazole delayed release (PRILOSEC D/R) 20 mg tablet One tab po daily x 4 wks then prn (Patient not taking: Reported on 2/1/2022) 90 Tablet 0     No current facility-administered medications on file prior to visit. Allergies   Allergen Reactions    Lisinopril Cough     Review of Systems   Constitutional: Negative for chills, fever and malaise/fatigue. Respiratory: Negative for cough, shortness of breath and wheezing. Cardiovascular: Negative for chest pain, palpitations and leg swelling. Gastrointestinal: Negative for diarrhea and vomiting. Genitourinary: Negative for dysuria. Neurological: Negative for dizziness, tingling and weakness. Psychiatric/Behavioral: Negative for depression. The patient is not nervous/anxious. Objective  Visit Vitals  /72 (BP 1 Location: Left upper arm, BP Patient Position: At rest, BP Cuff Size: Adult)   Pulse 78   Temp 97.5 °F (36.4 °C) (Temporal)   Ht 5' 8\" (1.727 m)   Wt 166 lb (75.3 kg)   SpO2 95%   BMI 25.24 kg/m²     Physical Exam  Constitutional:       General: He is not in acute distress. Appearance: Normal appearance. He is normal weight. HENT:      Head: Normocephalic and atraumatic. Neck:      Thyroid: No thyroid mass or thyromegaly. Cardiovascular:      Rate and Rhythm: Normal rate and regular rhythm. Heart sounds: No murmur heard. Pulmonary:      Effort: Pulmonary effort is normal. No respiratory distress. Breath sounds: Normal breath sounds. No wheezing.    Musculoskeletal: Cervical back: Neck supple. No muscular tenderness. Right lower leg: No edema. Left lower leg: No edema. Lymphadenopathy:      Cervical: No cervical adenopathy. Skin:     General: Skin is warm and dry. Neurological:      Mental Status: He is alert and oriented to person, place, and time. Mental status is at baseline. Psychiatric:         Attention and Perception: Attention and perception normal.         Mood and Affect: Mood and affect normal.         Speech: Speech normal.         Behavior: Behavior normal.          Assessment & Plan    ICD-10-CM ICD-9-CM    1. Wellness examination  Z00.00 V70.0    2. Screening for lipoid disorders  Z13.220 V77.91 LIPID PANEL      METABOLIC PANEL, COMPREHENSIVE      CBC WITH AUTOMATED DIFF   3. Depression screening  Z13.31 V79.0    4. Encounter for immunization  Z23 V03.89    5. Screening for prostate cancer  Z12.5 V76.44 PSA W/ REFLX FREE PSA   6. Type 2 diabetes mellitus without complication, without long-term current use of insulin (HCC)  E11.9 250.00 HEMOGLOBIN A1C WITH EAG      MICROALBUMIN, UR, RAND W/ MICROALB/CREAT RATIO   7. Mixed hyperlipidemia  E78.2 272.2 LIPID PANEL      METABOLIC PANEL, COMPREHENSIVE      CBC WITH AUTOMATED DIFF     Diagnoses and all orders for this visit:    1. Wellness examination  We are getting patient up to date on recommended preventative measures as noted. 2. Screening for lipoid disorders  -     LIPID PANEL  -     METABOLIC PANEL, COMPREHENSIVE  -     CBC WITH AUTOMATED DIFF    3. Depression screening    4. Encounter for immunization  Patient declines flu vaccine and Covid vaccine. We did review risks. He is amenable to having the Shingrix series but would like to wait until he does not have to work the following day. He is going to check with the pharmacy to have this set for a Friday. 5. Screening for prostate cancer  -     PSA W/ REFLX FREE PSA    6.  Type 2 diabetes mellitus without complication, without long-term current use of insulin (HCC)  -     HEMOGLOBIN A1C WITH EAG  -     MICROALBUMIN, UR, RAND W/ MICROALB/CREAT RATIO  Patient reports taking Metformin and Trulicity as directed. We will check levels and make adjustments if necessary. 7. Mixed hyperlipidemia  -     LIPID PANEL  -     METABOLIC PANEL, COMPREHENSIVE  -     CBC WITH AUTOMATED DIFF  Patient reports taking medication daily as directed. We are checking annual level with labs. Follow-up and Dispositions    · Return in about 6 months (around 8/1/2022) for f/u chronic issues, NON fasting.        Choco Zhou MD

## 2022-02-01 NOTE — PROGRESS NOTES
Chief Complaint   Patient presents with   Benjamin Costa Annual Wellness Visit     1. Have you been to the ER, urgent care clinic since your last visit? Hospitalized since your last visit? No    2. Have you seen or consulted any other health care providers outside of the [address] [place] [address] since your last visit? Include any pap smears or colon screening.  No       3 most recent PHQ Screens 2/1/2022   Little interest or pleasure in doing things Not at all   Feeling down, depressed, irritable, or hopeless Not at all   Total Score PHQ 2 0

## 2022-02-02 LAB
ALBUMIN SERPL-MCNC: 5 G/DL (ref 3.8–4.8)
ALBUMIN/CREAT UR: 40 MG/G CREAT (ref 0–29)
ALBUMIN/GLOB SERPL: 2.2 {RATIO} (ref 1.2–2.2)
ALP SERPL-CCNC: 62 IU/L (ref 44–121)
ALT SERPL-CCNC: 12 IU/L (ref 0–44)
AST SERPL-CCNC: 16 IU/L (ref 0–40)
BASOPHILS # BLD AUTO: 0.1 X10E3/UL (ref 0–0.2)
BASOPHILS NFR BLD AUTO: 1 %
BILIRUB SERPL-MCNC: 0.3 MG/DL (ref 0–1.2)
BUN SERPL-MCNC: 16 MG/DL (ref 8–27)
BUN/CREAT SERPL: 13 (ref 10–24)
CALCIUM SERPL-MCNC: 9.7 MG/DL (ref 8.6–10.2)
CHLORIDE SERPL-SCNC: 99 MMOL/L (ref 96–106)
CHOLEST SERPL-MCNC: 159 MG/DL (ref 100–199)
CO2 SERPL-SCNC: 25 MMOL/L (ref 20–29)
CREAT SERPL-MCNC: 1.27 MG/DL (ref 0.76–1.27)
CREAT UR-MCNC: 199.9 MG/DL
EOSINOPHIL # BLD AUTO: 0.2 X10E3/UL (ref 0–0.4)
EOSINOPHIL NFR BLD AUTO: 2 %
ERYTHROCYTE [DISTWIDTH] IN BLOOD BY AUTOMATED COUNT: 12.3 % (ref 11.6–15.4)
EST. AVERAGE GLUCOSE BLD GHB EST-MCNC: 154 MG/DL
GLOBULIN SER CALC-MCNC: 2.3 G/DL (ref 1.5–4.5)
GLUCOSE SERPL-MCNC: 134 MG/DL (ref 65–99)
HBA1C MFR BLD: 7 % (ref 4.8–5.6)
HCT VFR BLD AUTO: 42 % (ref 37.5–51)
HDLC SERPL-MCNC: 49 MG/DL
HGB BLD-MCNC: 13.9 G/DL (ref 13–17.7)
IMM GRANULOCYTES # BLD AUTO: 0 X10E3/UL (ref 0–0.1)
IMM GRANULOCYTES NFR BLD AUTO: 0 %
LDLC SERPL CALC-MCNC: 95 MG/DL (ref 0–99)
LYMPHOCYTES # BLD AUTO: 1.9 X10E3/UL (ref 0.7–3.1)
LYMPHOCYTES NFR BLD AUTO: 28 %
MCH RBC QN AUTO: 29.4 PG (ref 26.6–33)
MCHC RBC AUTO-ENTMCNC: 33.1 G/DL (ref 31.5–35.7)
MCV RBC AUTO: 89 FL (ref 79–97)
MICROALBUMIN UR-MCNC: 79.9 UG/ML
MONOCYTES # BLD AUTO: 0.7 X10E3/UL (ref 0.1–0.9)
MONOCYTES NFR BLD AUTO: 10 %
NEUTROPHILS # BLD AUTO: 4.2 X10E3/UL (ref 1.4–7)
NEUTROPHILS NFR BLD AUTO: 59 %
PLATELET # BLD AUTO: 299 X10E3/UL (ref 150–450)
POTASSIUM SERPL-SCNC: 5.2 MMOL/L (ref 3.5–5.2)
PROT SERPL-MCNC: 7.3 G/DL (ref 6–8.5)
PSA SERPL-MCNC: 2.8 NG/ML (ref 0–4)
RBC # BLD AUTO: 4.73 X10E6/UL (ref 4.14–5.8)
REFLEX CRITERIA: NORMAL
SODIUM SERPL-SCNC: 137 MMOL/L (ref 134–144)
TRIGL SERPL-MCNC: 78 MG/DL (ref 0–149)
VLDLC SERPL CALC-MCNC: 15 MG/DL (ref 5–40)
WBC # BLD AUTO: 7.1 X10E3/UL (ref 3.4–10.8)

## 2022-02-03 DIAGNOSIS — E11.29 TYPE 2 DIABETES MELLITUS WITH MICROALBUMINURIA, WITHOUT LONG-TERM CURRENT USE OF INSULIN (HCC): Primary | ICD-10-CM

## 2022-02-03 DIAGNOSIS — R80.9 TYPE 2 DIABETES MELLITUS WITH MICROALBUMINURIA, WITHOUT LONG-TERM CURRENT USE OF INSULIN (HCC): Primary | ICD-10-CM

## 2022-02-03 RX ORDER — LOSARTAN POTASSIUM 25 MG/1
25 TABLET ORAL DAILY
Qty: 90 TABLET | Refills: 1 | Status: SHIPPED | OUTPATIENT
Start: 2022-02-03 | End: 2022-08-01 | Stop reason: SDUPTHER

## 2022-02-25 RX ORDER — DULAGLUTIDE 0.75 MG/.5ML
INJECTION, SOLUTION SUBCUTANEOUS
Qty: 6 ML | Refills: 2 | Status: SHIPPED | OUTPATIENT
Start: 2022-02-25 | End: 2022-08-01 | Stop reason: SDUPTHER

## 2022-03-09 RX ORDER — ATORVASTATIN CALCIUM 10 MG/1
TABLET, FILM COATED ORAL
Qty: 90 TABLET | Refills: 3 | Status: CANCELLED | OUTPATIENT
Start: 2022-03-09

## 2022-03-09 RX ORDER — ATORVASTATIN CALCIUM 10 MG/1
TABLET, FILM COATED ORAL
Qty: 90 TABLET | Refills: 3 | Status: SHIPPED | OUTPATIENT
Start: 2022-03-09

## 2022-03-22 RX ORDER — METFORMIN HYDROCHLORIDE 500 MG/1
TABLET, EXTENDED RELEASE ORAL
Qty: 270 TABLET | Refills: 2 | Status: SHIPPED | OUTPATIENT
Start: 2022-03-22 | End: 2022-08-01 | Stop reason: SDUPTHER

## 2022-08-01 ENCOUNTER — OFFICE VISIT (OUTPATIENT)
Dept: FAMILY MEDICINE CLINIC | Age: 62
End: 2022-08-01
Payer: COMMERCIAL

## 2022-08-01 VITALS
WEIGHT: 169 LBS | HEART RATE: 85 BPM | HEIGHT: 68 IN | BODY MASS INDEX: 25.61 KG/M2 | TEMPERATURE: 97.5 F | SYSTOLIC BLOOD PRESSURE: 120 MMHG | DIASTOLIC BLOOD PRESSURE: 76 MMHG | OXYGEN SATURATION: 95 %

## 2022-08-01 DIAGNOSIS — R80.9 TYPE 2 DIABETES MELLITUS WITH MICROALBUMINURIA, WITHOUT LONG-TERM CURRENT USE OF INSULIN (HCC): Primary | ICD-10-CM

## 2022-08-01 DIAGNOSIS — R39.12 BENIGN PROSTATIC HYPERPLASIA WITH WEAK URINARY STREAM: ICD-10-CM

## 2022-08-01 DIAGNOSIS — N40.1 BENIGN PROSTATIC HYPERPLASIA WITH WEAK URINARY STREAM: ICD-10-CM

## 2022-08-01 DIAGNOSIS — E11.29 TYPE 2 DIABETES MELLITUS WITH MICROALBUMINURIA, WITHOUT LONG-TERM CURRENT USE OF INSULIN (HCC): Primary | ICD-10-CM

## 2022-08-01 LAB — HBA1C MFR BLD HPLC: 6.9 %

## 2022-08-01 PROCEDURE — 83036 HEMOGLOBIN GLYCOSYLATED A1C: CPT | Performed by: FAMILY MEDICINE

## 2022-08-01 PROCEDURE — 99214 OFFICE O/P EST MOD 30 MIN: CPT | Performed by: FAMILY MEDICINE

## 2022-08-01 PROCEDURE — 3051F HG A1C>EQUAL 7.0%<8.0%: CPT | Performed by: FAMILY MEDICINE

## 2022-08-01 RX ORDER — METFORMIN HYDROCHLORIDE 500 MG/1
TABLET, EXTENDED RELEASE ORAL
Qty: 270 TABLET | Refills: 1 | Status: SHIPPED | OUTPATIENT
Start: 2022-08-01

## 2022-08-01 RX ORDER — LOSARTAN POTASSIUM 25 MG/1
25 TABLET ORAL DAILY
Qty: 90 TABLET | Refills: 1 | Status: SHIPPED | OUTPATIENT
Start: 2022-08-01 | End: 2022-08-02

## 2022-08-01 RX ORDER — DULAGLUTIDE 0.75 MG/.5ML
INJECTION, SOLUTION SUBCUTANEOUS
Qty: 6 ML | Refills: 1 | Status: SHIPPED | OUTPATIENT
Start: 2022-08-01

## 2022-08-01 NOTE — PROGRESS NOTES
Subjective  Chief Complaint   Patient presents with    Follow Up Chronic Condition     HPI:  Danika Dawson is a 57 y.o. male. He is following up today on history of diabetes. He reports taking his medication as directed. Home sugar readings are typically 140s although he admits to not checking very frequently. He reports overall feeling well but does admit to some signs of prostate enlargement including weak urinary stream, some dribbling, etc.  It is manageable and has not negatively affected his lifestyle to date.     Past Medical History:   Diagnosis Date    Basal cell carcinoma     Diabetes mellitus type 2, controlled ([facility])     GERD (gastroesophageal reflux disease)     Insomnia     Mixed hyperlipidemia     Obstructive sleep apnea      Family History   Problem Relation Age of Onset    No Known Problems Mother     No Known Problems Father      Social History     Socioeconomic History    Marital status:      Spouse name: Not on file    Number of children: Not on file    Years of education: Not on file    Highest education level: Not on file   Occupational History    Not on file   Tobacco Use    Smoking status: Never    Smokeless tobacco: Never   Vaping Use    Vaping Use: Never used   Substance and Sexual Activity    Alcohol use: Yes    Drug use: Never    Sexual activity: Not on file   Other Topics Concern    Not on file   Social History Narrative    Not on file     Social Determinants of Health     Financial Resource Strain: Low Risk     Difficulty of Paying Living Expenses: Not hard at all   Food Insecurity: No Food Insecurity    Worried About Running Out of Food in the Last Year: Never true    Ran Out of Food in the Last Year: Never true   Transportation Needs: Not on file   Physical Activity: Not on file   Stress: Not on file   Social Connections: Not on file   Intimate Partner Violence: Not on file   Housing Stability: Not on file     Current Outpatient Medications on File Prior to Visit Medication Sig Dispense Refill    atorvastatin (LIPITOR) 10 mg tablet TAKE 1 TABLET BY MOUTH EVERY DAY AT BEDTIME 90 Tablet 3    doxepin (SINEquan) 25 mg capsule TAKE 1 TO 2 CAPSULES BY MOUTH EVERY NIGHT AT BEDTIME AS NEEDED FOR SLEEP 180 Capsule 2    aspirin delayed-release 81 mg tablet Take  by mouth daily. glucose blood VI test strips (OneTouch Verio test strips) strip by Does Not Apply route See Admin Instructions. [DISCONTINUED] metFORMIN ER (GLUCOPHAGE XR) 500 mg tablet TAKE 3 TABLETS BY MOUTH DAILY 270 Tablet 2    [DISCONTINUED] Trulicity 0.58 PJ/7.0 mL sub-q pen ADMINISTER CONTENTS OF 1 PEN UNDER THE SKIN EVERY WEEK 6 mL 2    [DISCONTINUED] losartan (COZAAR) 25 mg tablet Take 1 Tablet by mouth daily. 90 Tablet 1     No current facility-administered medications on file prior to visit. Allergies   Allergen Reactions    Lisinopril Cough     Review of Systems   Constitutional:  Negative for chills, fever and malaise/fatigue. Respiratory:  Negative for cough, shortness of breath and wheezing. Cardiovascular:  Negative for chest pain, palpitations and leg swelling. Gastrointestinal:  Negative for diarrhea and vomiting. Genitourinary:  Negative for dysuria. Neurological:  Negative for dizziness, tingling and weakness. Psychiatric/Behavioral:  Negative for depression. The patient is not nervous/anxious. Objective  Visit Vitals  /76 (BP 1 Location: Right arm, BP Patient Position: At rest, BP Cuff Size: Adult)   Pulse 85   Temp 97.5 °F (36.4 °C) (Temporal)   Ht 5' 8\" (1.727 m)   Wt 169 lb (76.7 kg)   SpO2 95%   BMI 25.70 kg/m²     Physical Exam  Constitutional:       General: He is not in acute distress. Appearance: Normal appearance. He is normal weight. HENT:      Head: Normocephalic and atraumatic. Neck:      Thyroid: No thyroid mass or thyromegaly. Cardiovascular:      Rate and Rhythm: Normal rate and regular rhythm. Heart sounds: No murmur heard.   Pulmonary: Effort: Pulmonary effort is normal. No respiratory distress. Breath sounds: Normal breath sounds. No wheezing. Musculoskeletal:      Cervical back: Neck supple. No muscular tenderness. Right lower leg: No edema. Left lower leg: No edema. Lymphadenopathy:      Cervical: No cervical adenopathy. Skin:     General: Skin is warm and dry. Neurological:      Mental Status: He is alert and oriented to person, place, and time. Mental status is at baseline. Psychiatric:         Attention and Perception: Attention and perception normal.         Mood and Affect: Mood and affect normal.         Speech: Speech normal.         Behavior: Behavior normal.        Assessment & Plan    ICD-10-CM ICD-9-CM    1. Type 2 diabetes mellitus with microalbuminuria, without long-term current use of insulin ([facility])  E11.29 250.40 AMB POC HEMOGLOBIN A1C    R80.9 791.0 metFORMIN ER (GLUCOPHAGE XR) 500 mg tablet      dulaglutide (Trulicity) 4.60 RM/4.4 mL sub-q pen      losartan (COZAAR) 25 mg tablet      2. Benign prostatic hyperplasia with weak urinary stream  N40.1 600.01     R39.12 788.62         Diagnoses and all orders for this visit:    1. Type 2 diabetes mellitus with microalbuminuria, without long-term current use of insulin ([facility])  -     AMB POC HEMOGLOBIN A1C  -     metFORMIN ER (GLUCOPHAGE XR) 500 mg tablet; TAKE 3 TABLETS BY MOUTH DAILY  -     dulaglutide (Trulicity) 8.28 OY/1.9 mL sub-q pen; ADMINISTER CONTENTS OF 1 PEN UNDER THE SKIN EVERY WEEK  -     losartan (COZAAR) 25 mg tablet; Take 1 Tablet by mouth in the morning. A1c is at goal at 6.9% today. We will continue current medication regimen and see him back in 6 months. 2. Benign prostatic hyperplasia with weak urinary stream  We discussed potentially starting Flomax to help with symptoms but he feels that symptoms are mild enough at this time that he would like to hold off.   If he changes his mind he is can simply call the office for order. Aspects of this note have been generated using voice recognition software. Despite editing, there may be some syntax errors.   Follow-up and Dispositions    Return in about 6 months (around 2/1/2023) for wellness, f/u- ideally fast.       Lucinda Encarnacion MD

## 2022-08-01 NOTE — PROGRESS NOTES
Chief Complaint   Patient presents with    Follow Up Chronic Condition     1. Have you been to the ER, urgent care clinic since your last visit? Hospitalized since your last visit? No    2. Have you seen or consulted any other health care providers outside of the [address] [place] [address] since your last visit? Include any pap smears or colon screening.  No      3 most recent PHQ Screens 8/1/2022   Little interest or pleasure in doing things Not at all   Feeling down, depressed, irritable, or hopeless Not at all   Total Score PHQ 2 0

## 2022-08-02 DIAGNOSIS — R80.9 TYPE 2 DIABETES MELLITUS WITH MICROALBUMINURIA, WITHOUT LONG-TERM CURRENT USE OF INSULIN (HCC): ICD-10-CM

## 2022-08-02 DIAGNOSIS — E11.29 TYPE 2 DIABETES MELLITUS WITH MICROALBUMINURIA, WITHOUT LONG-TERM CURRENT USE OF INSULIN (HCC): ICD-10-CM

## 2022-08-02 RX ORDER — LOSARTAN POTASSIUM 25 MG/1
TABLET ORAL
Qty: 90 TABLET | Refills: 1 | Status: SHIPPED | OUTPATIENT
Start: 2022-08-02 | End: 2022-11-01

## 2022-10-06 RX ORDER — DOXEPIN HYDROCHLORIDE 25 MG/1
CAPSULE ORAL
Qty: 180 CAPSULE | Refills: 2 | Status: SHIPPED | OUTPATIENT
Start: 2022-10-06

## 2022-11-01 DIAGNOSIS — R80.9 TYPE 2 DIABETES MELLITUS WITH MICROALBUMINURIA, WITHOUT LONG-TERM CURRENT USE OF INSULIN (HCC): ICD-10-CM

## 2022-11-01 DIAGNOSIS — E11.29 TYPE 2 DIABETES MELLITUS WITH MICROALBUMINURIA, WITHOUT LONG-TERM CURRENT USE OF INSULIN (HCC): ICD-10-CM

## 2022-11-01 RX ORDER — LOSARTAN POTASSIUM 25 MG/1
TABLET ORAL
Qty: 90 TABLET | Refills: 1 | Status: SHIPPED | OUTPATIENT
Start: 2022-11-01

## 2023-01-03 DIAGNOSIS — R80.9 TYPE 2 DIABETES MELLITUS WITH MICROALBUMINURIA, WITHOUT LONG-TERM CURRENT USE OF INSULIN (HCC): ICD-10-CM

## 2023-01-03 DIAGNOSIS — E11.29 TYPE 2 DIABETES MELLITUS WITH MICROALBUMINURIA, WITHOUT LONG-TERM CURRENT USE OF INSULIN (HCC): ICD-10-CM

## 2023-01-03 RX ORDER — DULAGLUTIDE 0.75 MG/.5ML
INJECTION, SOLUTION SUBCUTANEOUS
Qty: 6 ML | Refills: 1 | Status: SHIPPED | OUTPATIENT
Start: 2023-01-03

## 2023-02-01 ENCOUNTER — OFFICE VISIT (OUTPATIENT)
Dept: FAMILY MEDICINE CLINIC | Age: 63
End: 2023-02-01
Payer: COMMERCIAL

## 2023-02-01 VITALS
WEIGHT: 169.8 LBS | OXYGEN SATURATION: 96 % | DIASTOLIC BLOOD PRESSURE: 76 MMHG | HEIGHT: 68 IN | SYSTOLIC BLOOD PRESSURE: 118 MMHG | BODY MASS INDEX: 25.73 KG/M2 | TEMPERATURE: 97.1 F | HEART RATE: 95 BPM

## 2023-02-01 DIAGNOSIS — E78.2 MIXED HYPERLIPIDEMIA: ICD-10-CM

## 2023-02-01 DIAGNOSIS — Z13.220 SCREENING FOR LIPOID DISORDERS: ICD-10-CM

## 2023-02-01 DIAGNOSIS — R19.8 BOWEL MOVEMENT SYMPTOM: ICD-10-CM

## 2023-02-01 DIAGNOSIS — E11.29 TYPE 2 DIABETES MELLITUS WITH MICROALBUMINURIA, WITHOUT LONG-TERM CURRENT USE OF INSULIN (HCC): ICD-10-CM

## 2023-02-01 DIAGNOSIS — Z12.5 SCREENING FOR PROSTATE CANCER: ICD-10-CM

## 2023-02-01 DIAGNOSIS — R80.9 TYPE 2 DIABETES MELLITUS WITH MICROALBUMINURIA, WITHOUT LONG-TERM CURRENT USE OF INSULIN (HCC): ICD-10-CM

## 2023-02-01 DIAGNOSIS — Z23 ENCOUNTER FOR IMMUNIZATION: ICD-10-CM

## 2023-02-01 DIAGNOSIS — Z13.31 DEPRESSION SCREENING: ICD-10-CM

## 2023-02-01 DIAGNOSIS — Z00.00 WELLNESS EXAMINATION: Primary | ICD-10-CM

## 2023-02-01 RX ORDER — DULAGLUTIDE 0.75 MG/.5ML
0.75 INJECTION, SOLUTION SUBCUTANEOUS
Qty: 6 ML | Refills: 1 | Status: SHIPPED | OUTPATIENT
Start: 2023-02-01 | End: 2023-02-03 | Stop reason: DRUGHIGH

## 2023-02-01 RX ORDER — ATORVASTATIN CALCIUM 10 MG/1
TABLET, FILM COATED ORAL
Qty: 90 TABLET | Refills: 3 | Status: SHIPPED | OUTPATIENT
Start: 2023-02-01

## 2023-02-01 RX ORDER — LOSARTAN POTASSIUM 25 MG/1
TABLET ORAL
Qty: 90 TABLET | Refills: 1 | Status: SHIPPED | OUTPATIENT
Start: 2023-02-01

## 2023-02-01 RX ORDER — METFORMIN HYDROCHLORIDE 500 MG/1
TABLET, EXTENDED RELEASE ORAL
Qty: 270 TABLET | Refills: 1 | Status: SHIPPED | OUTPATIENT
Start: 2023-02-01

## 2023-02-01 NOTE — PROGRESS NOTES
Subjective  Chief Complaint   Patient presents with    Annual Wellness Visit     HPI:  Morales Amato is a 60 y.o. male. Morales Amato is on the schedule today for annual wellness exam and is also due for follow-up of chronic issues. he is willing to do both appointments today and realizes that there may be a co-pay for the follow-up portion of the visit. For the wellness:  Flu vaccine- Recommended every fall  COVID vaccine primary series- complete  Tetanus- Tdap 2019  Shingrix- series not completed  Pneumovax 23- 2019  Prevnar 21- at age 71  HCV screening- complete  PSA- due  Colon cancer screening- colonoscopy 5/2014. Recall 10. Smoking status- quit smokeless in 2014  AAA screening- N/A  Low dose CT scan- N/A  PHQ2 Score = 0  Exercise- active lifestyle    For the acute/chronic issues:  He is also following up with histories of diabetes and hyperlipidemia. He reports taking medications as directed. Is not checking home sugar readings. He is planning on starting him on an exercise program that his wife has been utilizing.     Past Medical History:   Diagnosis Date    Basal cell carcinoma     Diabetes mellitus type 2, controlled ([facility])     GERD (gastroesophageal reflux disease)     Insomnia     Mixed hyperlipidemia     Obstructive sleep apnea      Family History   Problem Relation Age of Onset    No Known Problems Mother     No Known Problems Father      Social History     Socioeconomic History    Marital status:      Spouse name: Not on file    Number of children: Not on file    Years of education: Not on file    Highest education level: Not on file   Occupational History    Not on file   Tobacco Use    Smoking status: Never    Smokeless tobacco: Former     Types: Snuff     Quit date: 6/14/2014   Vaping Use    Vaping Use: Never used   Substance and Sexual Activity    Alcohol use: Yes    Drug use: Never    Sexual activity: Not on file   Other Topics Concern    Not on file   Social History Narrative    Not on file     Social Determinants of Health     Financial Resource Strain: Low Risk     Difficulty of Paying Living Expenses: Not hard at all   Food Insecurity: No Food Insecurity    Worried About Running Out of Food in the Last Year: Never true    Ran Out of Food in the Last Year: Never true   Transportation Needs: Not on file   Physical Activity: Not on file   Stress: Not on file   Social Connections: Not on file   Intimate Partner Violence: Not on file   Housing Stability: Not on file     Current Outpatient Medications on File Prior to Visit   Medication Sig Dispense Refill    doxepin (SINEquan) 25 mg capsule TAKE 1 TO 2 CAPSULES BY MOUTH EVERY NIGHT AT BEDTIME AS NEEDED FOR SLEEP 180 Capsule 2    aspirin delayed-release 81 mg tablet Take  by mouth daily. [DISCONTINUED] Trulicity 4.85 HO/2.6 mL sub-q pen ADMINISTER CONTENTS OF 1 PEN UNDER THE SKIN EVERY WEEK 6 mL 1    [DISCONTINUED] losartan (COZAAR) 25 mg tablet TAKE 1 TABLET BY MOUTH IN THE MORNING 90 Tablet 1    [DISCONTINUED] metFORMIN ER (GLUCOPHAGE XR) 500 mg tablet TAKE 3 TABLETS BY MOUTH DAILY 270 Tablet 1    [DISCONTINUED] atorvastatin (LIPITOR) 10 mg tablet TAKE 1 TABLET BY MOUTH EVERY DAY AT BEDTIME 90 Tablet 3    [DISCONTINUED] glucose blood VI test strips (OneTouch Verio test strips) strip by Does Not Apply route See Admin Instructions. (Patient not taking: Reported on 2/1/2023)       No current facility-administered medications on file prior to visit. Allergies   Allergen Reactions    Lisinopril Cough     Review of Systems   Constitutional:  Negative for chills, fever and malaise/fatigue. Respiratory:  Negative for cough, shortness of breath and wheezing. Cardiovascular:  Negative for chest pain, palpitations and leg swelling. Gastrointestinal:  Positive for diarrhea (soft stools but only occasionally diarrhea). Negative for vomiting. Genitourinary:  Negative for dysuria.    Neurological:  Negative for dizziness, tingling and weakness. Psychiatric/Behavioral:  Negative for depression. The patient is not nervous/anxious. Objective  Visit Vitals  /76 (BP 1 Location: Left arm, BP Patient Position: Sitting, BP Cuff Size: Adult)   Pulse 95   Temp 97.1 °F (36.2 °C) (Temporal)   Ht 5' 8\" (1.727 m)   Wt 169 lb 12.8 oz (77 kg)   SpO2 96%   BMI 25.82 kg/m²     Physical Exam  Constitutional:       General: He is not in acute distress. Appearance: Normal appearance. He is normal weight. HENT:      Head: Normocephalic and atraumatic. Neck:      Thyroid: No thyroid mass or thyromegaly. Cardiovascular:      Rate and Rhythm: Normal rate and regular rhythm. Heart sounds: No murmur heard. Pulmonary:      Effort: Pulmonary effort is normal. No respiratory distress. Breath sounds: Normal breath sounds. No wheezing. Musculoskeletal:      Cervical back: Neck supple. No muscular tenderness. Right lower leg: No edema. Left lower leg: No edema. Lymphadenopathy:      Cervical: No cervical adenopathy. Skin:     General: Skin is warm and dry. Neurological:      Mental Status: He is alert and oriented to person, place, and time. Mental status is at baseline. Psychiatric:         Attention and Perception: Attention and perception normal.         Mood and Affect: Mood and affect normal.         Speech: Speech normal.         Behavior: Behavior normal.        Assessment & Plan    ICD-10-CM ICD-9-CM    1. Wellness examination  Z00.00 V70.0       2. Screening for lipoid disorders  Z13.220 V77.91 LIPID PANEL      METABOLIC PANEL, COMPREHENSIVE      CBC WITH AUTOMATED DIFF      3. Encounter for immunization  Z23 V03.89 ZOSTER, SHINGRIX, (18 YRS +), IM      4. Depression screening  Z13.31 V79.0       5. Screening for prostate cancer  Z12.5 V76.44 PSA W/ REFLX FREE PSA      6.  Type 2 diabetes mellitus with microalbuminuria, without long-term current use of insulin ([facility])  E11.29 250.40 HEMOGLOBIN A1C WITH EAG R80.9 791.0 MICROALBUMIN, UR, RAND W/ MICROALB/CREAT RATIO      metFORMIN ER (GLUCOPHAGE XR) 500 mg tablet      losartan (COZAAR) 25 mg tablet      dulaglutide (Trulicity) 8.06 NX/0.2 mL sub-q pen      7. Mixed hyperlipidemia  E78.2 272.2 LIPID PANEL      METABOLIC PANEL, COMPREHENSIVE      CBC WITH AUTOMATED DIFF      atorvastatin (LIPITOR) 10 mg tablet      8. Bowel movement symptom  R19.8 787.99         Diagnoses and all orders for this visit:    1. Wellness examination  We are getting patient up to date on recommended preventative measures as noted. 2. Screening for lipoid disorders  -     LIPID PANEL  -     METABOLIC PANEL, COMPREHENSIVE  -     CBC WITH AUTOMATED DIFF    3. Encounter for immunization  -     ZOSTER, SHINGRIX, (18 YRS +), IM    4. Depression screening    5. Screening for prostate cancer  -     PSA W/ REFLX FREE PSA    6. Type 2 diabetes mellitus with microalbuminuria, without long-term current use of insulin ([facility])  -     HEMOGLOBIN A1C WITH EAG  -     MICROALBUMIN, UR, RAND W/ MICROALB/CREAT RATIO  -     metFORMIN ER (GLUCOPHAGE XR) 500 mg tablet; TAKE 3 TABLETS BY MOUTH DAILY  -     losartan (COZAAR) 25 mg tablet; TAKE 1 TABLET BY MOUTH IN THE MORNING  -     dulaglutide (Trulicity) 1.86 MV/9.2 mL sub-q pen; 0.5 mL by SubCUTAneous route every seven (7) days. Checking levels. We may be cutting back on his metformin due to loose stools. See below. 7. Mixed hyperlipidemia  -     LIPID PANEL  -     METABOLIC PANEL, COMPREHENSIVE  -     CBC WITH AUTOMATED DIFF  -     atorvastatin (LIPITOR) 10 mg tablet; TAKE 1 TABLET BY MOUTH EVERY DAY AT BEDTIME  Patient reports taking medication daily as directed. We are checking annual level with labs. 8. Bowel movement symptom  Review of systems revealing soft bowel movements. We reviewed that this may be a side effect of the metformin. He reports that if his A1c is looking good he may try cutting back but notes that the symptoms are manageable. I also offered that we could cut back his metformin regardless and increase the dose of Trulicity if needed. He understands. Aspects of this note have been generated using voice recognition software. Despite editing, there may be some syntax errors. Follow-up and Dispositions    Return in about 6 months (around 8/1/2023) for f/u chronic issues, NON fasting. end shingrix.        Violet Valdes MD

## 2023-02-01 NOTE — PROGRESS NOTES
Chief Complaint   Patient presents with    Annual Wellness Visit   Visit Vitals  /76 (BP 1 Location: Left arm, BP Patient Position: Sitting, BP Cuff Size: Adult)   Pulse 95   Temp 97.1 °F (36.2 °C) (Temporal)   Ht 5' 8\" (1.727 m)   Wt 169 lb 12.8 oz (77 kg)   SpO2 96%   BMI 25.82 kg/m²       1. \"Have you been to the ER, urgent care clinic since your last visit? Hospitalized since your last visit? \" No    2. \"Have you seen or consulted any other health care providers outside of the [address] [place] [address] since your last visit? \" No     3. For patients aged 39-70: Has the patient had a colonoscopy / FIT/ Cologuard? Yes - no Care Gap present      If the patient is female:    4. For patients aged 41-77: Has the patient had a mammogram within the past 2 years? NA - based on age or sex      11. For patients aged 21-65: Has the patient had a pap smear?  NA - based on age or sex  3 most recent PHQ Screens 2/1/2023   Little interest or pleasure in doing things Not at all   Feeling down, depressed, irritable, or hopeless Not at all   Total Score PHQ 2 0

## 2023-02-03 LAB
ALBUMIN SERPL-MCNC: 4.7 G/DL (ref 3.8–4.8)
ALBUMIN/CREAT UR: 41 MG/G CREAT (ref 0–29)
ALBUMIN/GLOB SERPL: 1.9 {RATIO} (ref 1.2–2.2)
ALP SERPL-CCNC: 69 IU/L (ref 44–121)
ALT SERPL-CCNC: 12 IU/L (ref 0–44)
AST SERPL-CCNC: 12 IU/L (ref 0–40)
BASOPHILS # BLD AUTO: 0.1 X10E3/UL (ref 0–0.2)
BASOPHILS NFR BLD AUTO: 1 %
BILIRUB SERPL-MCNC: 0.2 MG/DL (ref 0–1.2)
BUN SERPL-MCNC: 21 MG/DL (ref 8–27)
BUN/CREAT SERPL: 15 (ref 10–24)
CALCIUM SERPL-MCNC: 9.5 MG/DL (ref 8.6–10.2)
CHLORIDE SERPL-SCNC: 102 MMOL/L (ref 96–106)
CHOLEST SERPL-MCNC: 162 MG/DL (ref 100–199)
CO2 SERPL-SCNC: 21 MMOL/L (ref 20–29)
CREAT SERPL-MCNC: 1.39 MG/DL (ref 0.76–1.27)
CREAT UR-MCNC: 259.8 MG/DL
EGFRCR SERPLBLD CKD-EPI 2021: 57 ML/MIN/1.73
EOSINOPHIL # BLD AUTO: 0.2 X10E3/UL (ref 0–0.4)
EOSINOPHIL NFR BLD AUTO: 2 %
ERYTHROCYTE [DISTWIDTH] IN BLOOD BY AUTOMATED COUNT: 12.5 % (ref 11.6–15.4)
EST. AVERAGE GLUCOSE BLD GHB EST-MCNC: 169 MG/DL
GLOBULIN SER CALC-MCNC: 2.5 G/DL (ref 1.5–4.5)
GLUCOSE SERPL-MCNC: 163 MG/DL (ref 70–99)
HBA1C MFR BLD: 7.5 % (ref 4.8–5.6)
HCT VFR BLD AUTO: 42.9 % (ref 37.5–51)
HDLC SERPL-MCNC: 50 MG/DL
HGB BLD-MCNC: 14.2 G/DL (ref 13–17.7)
IMM GRANULOCYTES # BLD AUTO: 0 X10E3/UL (ref 0–0.1)
IMM GRANULOCYTES NFR BLD AUTO: 0 %
LDLC SERPL CALC-MCNC: 90 MG/DL (ref 0–99)
LYMPHOCYTES # BLD AUTO: 2 X10E3/UL (ref 0.7–3.1)
LYMPHOCYTES NFR BLD AUTO: 22 %
MCH RBC QN AUTO: 30.2 PG (ref 26.6–33)
MCHC RBC AUTO-ENTMCNC: 33.1 G/DL (ref 31.5–35.7)
MCV RBC AUTO: 91 FL (ref 79–97)
MICROALBUMIN UR-MCNC: 105.5 UG/ML
MONOCYTES # BLD AUTO: 0.8 X10E3/UL (ref 0.1–0.9)
MONOCYTES NFR BLD AUTO: 9 %
NEUTROPHILS # BLD AUTO: 6 X10E3/UL (ref 1.4–7)
NEUTROPHILS NFR BLD AUTO: 66 %
PLATELET # BLD AUTO: 323 X10E3/UL (ref 150–450)
POTASSIUM SERPL-SCNC: 5.1 MMOL/L (ref 3.5–5.2)
PROT SERPL-MCNC: 7.2 G/DL (ref 6–8.5)
PSA SERPL-MCNC: 3.2 NG/ML (ref 0–4)
RBC # BLD AUTO: 4.7 X10E6/UL (ref 4.14–5.8)
REFLEX CRITERIA: NORMAL
SODIUM SERPL-SCNC: 137 MMOL/L (ref 134–144)
TRIGL SERPL-MCNC: 125 MG/DL (ref 0–149)
VLDLC SERPL CALC-MCNC: 22 MG/DL (ref 5–40)
WBC # BLD AUTO: 8.9 X10E3/UL (ref 3.4–10.8)

## 2023-02-03 RX ORDER — DULAGLUTIDE 1.5 MG/.5ML
1.5 INJECTION, SOLUTION SUBCUTANEOUS
Qty: 6 ML | Refills: 1 | Status: SHIPPED | OUTPATIENT
Start: 2023-02-03

## 2023-05-19 RX ORDER — ASPIRIN 81 MG/1
TABLET ORAL DAILY
COMMUNITY

## 2023-05-19 RX ORDER — DOXEPIN HYDROCHLORIDE 25 MG/1
CAPSULE ORAL
COMMUNITY
Start: 2022-10-06

## 2023-05-19 RX ORDER — DULAGLUTIDE 1.5 MG/.5ML
1.5 INJECTION, SOLUTION SUBCUTANEOUS
COMMUNITY
Start: 2023-02-03

## 2023-05-19 RX ORDER — LOSARTAN POTASSIUM 25 MG/1
1 TABLET ORAL EVERY MORNING
COMMUNITY
Start: 2023-02-01

## 2023-05-19 RX ORDER — METFORMIN HYDROCHLORIDE 500 MG/1
3 TABLET, EXTENDED RELEASE ORAL DAILY
COMMUNITY
Start: 2023-04-06

## 2023-05-19 RX ORDER — ATORVASTATIN CALCIUM 10 MG/1
1 TABLET, FILM COATED ORAL NIGHTLY
COMMUNITY
Start: 2023-03-27

## 2023-07-06 ENCOUNTER — TELEMEDICINE (OUTPATIENT)
Facility: CLINIC | Age: 63
End: 2023-07-06
Payer: COMMERCIAL

## 2023-07-06 DIAGNOSIS — E11.29 TYPE 2 DIABETES MELLITUS WITH MICROALBUMINURIA, WITHOUT LONG-TERM CURRENT USE OF INSULIN (HCC): Primary | ICD-10-CM

## 2023-07-06 DIAGNOSIS — R80.9 TYPE 2 DIABETES MELLITUS WITH MICROALBUMINURIA, WITHOUT LONG-TERM CURRENT USE OF INSULIN (HCC): Primary | ICD-10-CM

## 2023-07-06 PROCEDURE — 99213 OFFICE O/P EST LOW 20 MIN: CPT | Performed by: FAMILY MEDICINE

## 2023-07-06 PROCEDURE — 3051F HG A1C>EQUAL 7.0%<8.0%: CPT | Performed by: FAMILY MEDICINE

## 2023-07-06 SDOH — ECONOMIC STABILITY: INCOME INSECURITY: HOW HARD IS IT FOR YOU TO PAY FOR THE VERY BASICS LIKE FOOD, HOUSING, MEDICAL CARE, AND HEATING?: NOT VERY HARD

## 2023-07-06 SDOH — ECONOMIC STABILITY: FOOD INSECURITY: WITHIN THE PAST 12 MONTHS, THE FOOD YOU BOUGHT JUST DIDN'T LAST AND YOU DIDN'T HAVE MONEY TO GET MORE.: NEVER TRUE

## 2023-07-06 SDOH — ECONOMIC STABILITY: FOOD INSECURITY: WITHIN THE PAST 12 MONTHS, YOU WORRIED THAT YOUR FOOD WOULD RUN OUT BEFORE YOU GOT MONEY TO BUY MORE.: NEVER TRUE

## 2023-07-06 SDOH — ECONOMIC STABILITY: HOUSING INSECURITY
IN THE LAST 12 MONTHS, WAS THERE A TIME WHEN YOU DID NOT HAVE A STEADY PLACE TO SLEEP OR SLEPT IN A SHELTER (INCLUDING NOW)?: NO

## 2023-07-06 ASSESSMENT — PATIENT HEALTH QUESTIONNAIRE - PHQ9
SUM OF ALL RESPONSES TO PHQ QUESTIONS 1-9: 0
2. FEELING DOWN, DEPRESSED OR HOPELESS: 0
SUM OF ALL RESPONSES TO PHQ QUESTIONS 1-9: 0
1. LITTLE INTEREST OR PLEASURE IN DOING THINGS: 0
SUM OF ALL RESPONSES TO PHQ QUESTIONS 1-9: 0
SUM OF ALL RESPONSES TO PHQ9 QUESTIONS 1 & 2: 0
SUM OF ALL RESPONSES TO PHQ QUESTIONS 1-9: 0

## 2023-07-06 NOTE — PROGRESS NOTES
Consent:  Gabriel Martin, was evaluated through a synchronous (real-time) audio-video encounter. The patient (or guardian if applicable) is aware that this is a billable service, which includes applicable co-pays. This Virtual Visit was conducted with patient's (and/or legal guardian's) consent. Patient identification was verified, and a caregiver was present when appropriate. The patient and provider both located in [place] where the provider is licensed to provide care. Subjective:   Gabriel Martin is a 60 y.o. male who was seen for Follow-up Chronic Condition    We are following up today on diabetes which was uncontrolled on last check. We had to cut his metformin back from 4 tabs to 3 tabs daily due to GI side effects and increased his Trulicity to 1.5 mg weekly. He reports no side effect issues at current dosing and is taking medication regularly. He has equipment at home to check sugars but has not done so recently. No acute complaints or concerns today. Prior to Admission medications    Medication Sig Start Date End Date Taking?  Authorizing Provider   aspirin 81 MG EC tablet Take by mouth daily   Yes Ar Automatic Reconciliation   atorvastatin (LIPITOR) 10 MG tablet Take 1 tablet by mouth nightly 3/27/23  Yes Ar Automatic Reconciliation   doxepin (SINEQUAN) 25 MG capsule TAKE 1 TO 2 CAPSULES BY MOUTH EVERY NIGHT AT BEDTIME AS NEEDED FOR SLEEP 10/6/22  Yes Ar Automatic Reconciliation   dulaglutide (TRULICITY) 1.5 MA/8.2OT SC injection Inject 0.5 mLs into the skin every 7 days 2/3/23  Yes Ar Automatic Reconciliation   losartan (COZAAR) 25 MG tablet Take 1 tablet by mouth every morning 2/1/23  Yes Ar Automatic Reconciliation   metFORMIN (GLUCOPHAGE-XR) 500 MG extended release tablet Take 3 tablets by mouth daily 4/6/23  Yes Ar Automatic Reconciliation     Allergies   Allergen Reactions    Lisinopril Cough     Patient Active Problem List   Diagnosis    Gastroesophageal reflux disease without

## 2023-07-06 NOTE — PROGRESS NOTES
Identified Patient with two Patient identifiers(name and ). Please send link to [phone]        1. Have you been to the ER, urgent care clinic since your last visit? Hospitalized since your last visit? No    2. Have you seen or consulted any other health care providers outside of the [address] [place] [address] since your last visit? No     3. For patients aged 43-73: Has the patient had a colonoscopy / FIT/ Cologuard? Yes - Care Gap present. Most recent result on file. If the patient is female:    4. For patients aged 43-66: Has the patient had a mammogram within the past 2 years? NA - based on age/sex      5. For patients aged 21-65: Has the patient had a pap smear? NA - based on age/sex   There were no vitals taken for this visit. Chief Complaint   Patient presents with    Follow-up Chronic Condition             No questionnaires available.                       PHQ-9 Total Score: 0 (2023  7:19 AM)

## 2023-07-12 LAB — HBA1C MFR BLD: 7.2 % (ref 4.8–5.6)

## 2023-08-06 ENCOUNTER — PATIENT MESSAGE (OUTPATIENT)
Facility: CLINIC | Age: 63
End: 2023-08-06

## 2023-08-06 RX ORDER — DOXEPIN HYDROCHLORIDE 25 MG/1
CAPSULE ORAL
Qty: 180 CAPSULE | Refills: 1 | Status: SHIPPED | OUTPATIENT
Start: 2023-08-06

## 2023-08-08 ENCOUNTER — NURSE ONLY (OUTPATIENT)
Facility: CLINIC | Age: 63
End: 2023-08-08
Payer: COMMERCIAL

## 2023-08-08 DIAGNOSIS — Z23 ENCOUNTER FOR IMMUNIZATION: Primary | ICD-10-CM

## 2023-08-08 PROCEDURE — 90471 IMMUNIZATION ADMIN: CPT | Performed by: FAMILY MEDICINE

## 2023-08-08 PROCEDURE — 90750 HZV VACC RECOMBINANT IM: CPT | Performed by: FAMILY MEDICINE

## 2023-08-23 ENCOUNTER — PATIENT MESSAGE (OUTPATIENT)
Facility: CLINIC | Age: 63
End: 2023-08-23

## 2023-08-24 RX ORDER — DULAGLUTIDE 1.5 MG/.5ML
INJECTION, SOLUTION SUBCUTANEOUS
Qty: 6 ML | Refills: 1 | Status: SHIPPED | OUTPATIENT
Start: 2023-08-24

## 2023-08-24 NOTE — TELEPHONE ENCOUNTER
From: Hayder Aldridge  To: Dr. Brenda Bautista: 8/23/2023 11:01 AM EDT  Subject: Trulicity Refill    My [address] is having significant issues. I am out of my Trulicity 3.9IQ prescription - week overdue. A [employer] text message indicated it was ready for pickup. I just tried to pick it up and the pharmacy indicated they didn't have a record of it. I asked for it to be refilled and they said not possible without doctor's approval. Please refill my Trulicity prescription. Let me know if you see any issues I need to correct. I'm pretty sure this is just a [employer] issue.     Thanks,  DG

## 2023-11-20 ENCOUNTER — PATIENT MESSAGE (OUTPATIENT)
Facility: CLINIC | Age: 63
End: 2023-11-20

## 2023-11-24 RX ORDER — LOSARTAN POTASSIUM 25 MG/1
25 TABLET ORAL EVERY MORNING
Qty: 90 TABLET | Refills: 0 | Status: SHIPPED | OUTPATIENT
Start: 2023-11-24

## 2023-12-05 RX ORDER — DULAGLUTIDE 1.5 MG/.5ML
INJECTION, SOLUTION SUBCUTANEOUS
Qty: 6 ML | Refills: 1 | Status: SHIPPED | OUTPATIENT
Start: 2023-12-05

## 2024-01-15 ENCOUNTER — TELEPHONE (OUTPATIENT)
Facility: CLINIC | Age: 64
End: 2024-01-15

## 2024-01-15 NOTE — TELEPHONE ENCOUNTER
Drug  Trulicity 1.5MG/0.5ML pen-injectors    Form  Formerly [facility] Electronic PA Form (2017 NCPDP)    Pending CoverMyMeds

## 2024-01-15 NOTE — TELEPHONE ENCOUNTER
Re: CATIE VIEIRA, 1960,   [id number] [place] [address]         This is to inform you that your Prior Authorization request for the above member’s Trulicity 1.5MG/0.5ML SC SOPN has been approved. If you are changing the member's therapy the previously approved therapy will be canceled and replaced. The authorization is valid from 12/16/2023 through 01/14/2025. A letter of explanation will also be mailed to the patient. Approvals may be subject to dosing limits in accordance with FDA approved labeling, accepted compendia, evidence-based practice guidelines or your prescription drug plan benefits. You can go to [email or web address], scroll down and select downloaded here, and find the medication to see if there are dosing limits. This PA determination only applies to medications dispensed by a pharmacy and billed to the member’s pharmacy benefit. Now you can get responses to drug PAs immediately and securely online - without faxes, phone calls, or waiting. How? With electronic prior authorization (ePA)! For more information and to register, go to [email or web address]. **Please do not respond to this fax. It is for informational purposes only**

## 2024-03-01 RX ORDER — LOSARTAN POTASSIUM 25 MG/1
25 TABLET ORAL EVERY MORNING
Qty: 90 TABLET | Refills: 0 | Status: SHIPPED | OUTPATIENT
Start: 2024-03-01 | End: 2024-03-03

## 2024-03-01 NOTE — TELEPHONE ENCOUNTER
He is already overdue for follow up uncontrolled diabetes.  Please have him schedule for next available.  I prefer an office but if nothing open then okay to do virtual.

## 2024-03-03 RX ORDER — LOSARTAN POTASSIUM 25 MG/1
25 TABLET ORAL EVERY MORNING
Qty: 90 TABLET | Refills: 0 | Status: SHIPPED | OUTPATIENT
Start: 2024-03-03

## 2024-03-05 ENCOUNTER — TELEPHONE (OUTPATIENT)
Facility: CLINIC | Age: 64
End: 2024-03-05

## 2024-03-05 DIAGNOSIS — Z12.5 SCREENING FOR PROSTATE CANCER: ICD-10-CM

## 2024-03-05 DIAGNOSIS — R80.9 TYPE 2 DIABETES MELLITUS WITH MICROALBUMINURIA, WITHOUT LONG-TERM CURRENT USE OF INSULIN (HCC): Primary | ICD-10-CM

## 2024-03-05 DIAGNOSIS — Z13.220 SCREENING FOR LIPOID DISORDERS: ICD-10-CM

## 2024-03-05 DIAGNOSIS — E11.29 TYPE 2 DIABETES MELLITUS WITH MICROALBUMINURIA, WITHOUT LONG-TERM CURRENT USE OF INSULIN (HCC): Primary | ICD-10-CM

## 2024-03-05 DIAGNOSIS — Z11.59 SCREENING FOR VIRAL DISEASE: ICD-10-CM

## 2024-03-05 DIAGNOSIS — E78.2 MIXED HYPERLIPIDEMIA: ICD-10-CM

## 2024-03-05 NOTE — TELEPHONE ENCOUNTER
----- Message from Anju Ayala sent at 3/5/2024  1:34 PM EST -----  Subject: Referral Request    Reason for referral request? Patient is scheduled for a physical on   3/13/24. Did you want to open lab orders so he an do the bloodwork PRIOR   to his office visit so that results can be discussed in person? Please let   the patient know if/when the orders are opened and if fasting is required.  Provider patient wants to be referred to(if known):     Provider Phone Number(if known):    Additional Information for Provider?   ---------------------------------------------------------------------------  --------------  CALL BACK INFO    [phone]; OK to leave message on voicemail  ---------------------------------------------------------------------------  --------------

## 2024-03-05 NOTE — TELEPHONE ENCOUNTER
Left message advising patient that lab orders have been put in and he can call office back to schedule nurse visit.

## 2024-03-27 RX ORDER — ATORVASTATIN CALCIUM 10 MG/1
10 TABLET, FILM COATED ORAL NIGHTLY
Qty: 90 TABLET | Refills: 1 | Status: SHIPPED | OUTPATIENT
Start: 2024-03-27

## 2024-03-31 RX ORDER — DULAGLUTIDE 1.5 MG/.5ML
INJECTION, SOLUTION SUBCUTANEOUS
Qty: 6 ML | Refills: 1 | Status: SHIPPED | OUTPATIENT
Start: 2024-03-31

## 2024-04-17 ENCOUNTER — PATIENT MESSAGE (OUTPATIENT)
Facility: CLINIC | Age: 64
End: 2024-04-17

## 2024-04-17 NOTE — TELEPHONE ENCOUNTER
From: Marcell Markham  To: Dr. Melissa Karimi  Sent: 4/17/2024 6:48 AM EDT  Subject: Trulicity Rx    I have now gone three weeks without a dose of Trulicity. [facility] says the  is out of stock, they have no idea when it will be back in stock, but they will fill my open Rx when it is.    Any advice?    DG

## 2024-04-22 RX ORDER — DULAGLUTIDE 0.75 MG/.5ML
0.75 INJECTION, SOLUTION SUBCUTANEOUS WEEKLY
Qty: 2 ML | Refills: 1 | Status: SHIPPED | OUTPATIENT
Start: 2024-04-22

## 2024-05-28 RX ORDER — LOSARTAN POTASSIUM 25 MG/1
25 TABLET ORAL EVERY MORNING
Qty: 90 TABLET | Refills: 0 | Status: SHIPPED | OUTPATIENT
Start: 2024-05-28

## 2024-07-05 RX ORDER — METFORMIN HYDROCHLORIDE 500 MG/1
TABLET, EXTENDED RELEASE ORAL
Qty: 270 TABLET | Refills: 0 | Status: SHIPPED | OUTPATIENT
Start: 2024-07-05

## 2024-07-15 ENCOUNTER — OFFICE VISIT (OUTPATIENT)
Facility: CLINIC | Age: 64
End: 2024-07-15
Payer: COMMERCIAL

## 2024-07-15 VITALS
WEIGHT: 167 LBS | HEIGHT: 68 IN | HEART RATE: 95 BPM | BODY MASS INDEX: 25.31 KG/M2 | DIASTOLIC BLOOD PRESSURE: 78 MMHG | OXYGEN SATURATION: 98 % | SYSTOLIC BLOOD PRESSURE: 118 MMHG | TEMPERATURE: 97.9 F | RESPIRATION RATE: 20 BRPM

## 2024-07-15 DIAGNOSIS — N40.1 BENIGN PROSTATIC HYPERPLASIA WITH WEAK URINARY STREAM: ICD-10-CM

## 2024-07-15 DIAGNOSIS — Z23 ENCOUNTER FOR IMMUNIZATION: ICD-10-CM

## 2024-07-15 DIAGNOSIS — R80.9 TYPE 2 DIABETES MELLITUS WITH MICROALBUMINURIA, WITHOUT LONG-TERM CURRENT USE OF INSULIN (HCC): ICD-10-CM

## 2024-07-15 DIAGNOSIS — G47.00 INSOMNIA, UNSPECIFIED TYPE: ICD-10-CM

## 2024-07-15 DIAGNOSIS — Z12.11 COLON CANCER SCREENING: ICD-10-CM

## 2024-07-15 DIAGNOSIS — Z00.00 WELLNESS EXAMINATION: Primary | ICD-10-CM

## 2024-07-15 DIAGNOSIS — E78.2 MIXED HYPERLIPIDEMIA: ICD-10-CM

## 2024-07-15 DIAGNOSIS — R39.12 BENIGN PROSTATIC HYPERPLASIA WITH WEAK URINARY STREAM: ICD-10-CM

## 2024-07-15 DIAGNOSIS — E11.29 TYPE 2 DIABETES MELLITUS WITH MICROALBUMINURIA, WITHOUT LONG-TERM CURRENT USE OF INSULIN (HCC): ICD-10-CM

## 2024-07-15 PROCEDURE — 99214 OFFICE O/P EST MOD 30 MIN: CPT | Performed by: FAMILY MEDICINE

## 2024-07-15 PROCEDURE — 99396 PREV VISIT EST AGE 40-64: CPT | Performed by: FAMILY MEDICINE

## 2024-07-15 PROCEDURE — 90677 PCV20 VACCINE IM: CPT | Performed by: FAMILY MEDICINE

## 2024-07-15 PROCEDURE — 90471 IMMUNIZATION ADMIN: CPT | Performed by: FAMILY MEDICINE

## 2024-07-15 RX ORDER — DULAGLUTIDE 1.5 MG/.5ML
INJECTION, SOLUTION SUBCUTANEOUS
Qty: 6 ML | Refills: 1 | Status: SHIPPED | OUTPATIENT
Start: 2024-07-15 | End: 2024-07-17 | Stop reason: DRUGHIGH

## 2024-07-15 RX ORDER — DOXEPIN HYDROCHLORIDE 25 MG/1
CAPSULE ORAL
Qty: 180 CAPSULE | Refills: 1 | Status: SHIPPED | OUTPATIENT
Start: 2024-07-15

## 2024-07-15 RX ORDER — ATORVASTATIN CALCIUM 10 MG/1
10 TABLET, FILM COATED ORAL NIGHTLY
Qty: 90 TABLET | Refills: 3 | Status: SHIPPED | OUTPATIENT
Start: 2024-07-15 | End: 2024-07-17 | Stop reason: DRUGHIGH

## 2024-07-15 RX ORDER — METFORMIN HYDROCHLORIDE 500 MG/1
1000 TABLET, EXTENDED RELEASE ORAL
Qty: 180 TABLET | Refills: 1 | Status: SHIPPED | OUTPATIENT
Start: 2024-07-15

## 2024-07-15 RX ORDER — LOSARTAN POTASSIUM 25 MG/1
25 TABLET ORAL EVERY MORNING
Qty: 90 TABLET | Refills: 1 | Status: SHIPPED | OUTPATIENT
Start: 2024-07-15

## 2024-07-15 SDOH — ECONOMIC STABILITY: FOOD INSECURITY: WITHIN THE PAST 12 MONTHS, THE FOOD YOU BOUGHT JUST DIDN'T LAST AND YOU DIDN'T HAVE MONEY TO GET MORE.: NEVER TRUE

## 2024-07-15 SDOH — ECONOMIC STABILITY: INCOME INSECURITY: HOW HARD IS IT FOR YOU TO PAY FOR THE VERY BASICS LIKE FOOD, HOUSING, MEDICAL CARE, AND HEATING?: NOT VERY HARD

## 2024-07-15 SDOH — ECONOMIC STABILITY: FOOD INSECURITY: WITHIN THE PAST 12 MONTHS, YOU WORRIED THAT YOUR FOOD WOULD RUN OUT BEFORE YOU GOT MONEY TO BUY MORE.: NEVER TRUE

## 2024-07-15 ASSESSMENT — PATIENT HEALTH QUESTIONNAIRE - PHQ9
2. FEELING DOWN, DEPRESSED OR HOPELESS: NOT AT ALL
SUM OF ALL RESPONSES TO PHQ9 QUESTIONS 1 & 2: 0
SUM OF ALL RESPONSES TO PHQ QUESTIONS 1-9: 0
1. LITTLE INTEREST OR PLEASURE IN DOING THINGS: NOT AT ALL
SUM OF ALL RESPONSES TO PHQ QUESTIONS 1-9: 0

## 2024-07-15 NOTE — PROGRESS NOTES
\"Have you been to the ER, urgent care clinic since your last visit?  Hospitalized since your last visit?\"    NO    “Have you seen or consulted any other health care providers outside of [facility] since your last visit?”    NO  Chief Complaint   Patient presents with    Annual Exam      /78 (Site: Right Upper Arm, Position: Sitting, Cuff Size: Large Adult)   Temp 97.9 °F (36.6 °C) (Temporal)   Resp 20   Ht 1.727 m (5' 8\")   Wt 75.8 kg (167 lb)   SpO2 98%   BMI 25.39 kg/m²    PHQ-9 Total Score: 0 (7/15/2024  2:35 PM)         No questionnaires available.                              “Have you had a colorectal cancer screening such as a colonoscopy/FIT/Cologuard?    NO   Due for next colonoscopy  Date of last Colonoscopy: 2014  No cologuard on file  No FIT/FOBT on file   No flexible sigmoidoscopy on file         Click Here for Release of Records Request     Identified Patient with 2 Patient Identifiers-Name and   
vaccines:    - all adults to have flu and COVID vaccine in the fall  - 65+ to have additional COVID vaccine in spring  - 60+ to have one time RSV vaccine (available in fall and winter).    3. Colon cancer screening  -     [facility] - Luis Antonio Natarajan MD, Gastroenterology, [place]    4. Benign prostatic hyperplasia with weak urinary stream  -     [facility]  Contact information for recommended urology group provided as requested.  No intervention necessary at this time.    5. Type 2 diabetes mellitus with microalbuminuria, without long-term current use of insulin (HCC)  -     metFORMIN (GLUCOPHAGE-XR) 500 MG extended release tablet; Take 2 tablets by mouth daily (with breakfast), Disp-180 tablet, R-1Normal  -     dulaglutide (TRULICITY) 1.5 MG/0.5ML SC injection; ADMINISTER 1.5 MG UNDER THE SKIN EVERY 7 DAYS, Disp-6 mL, R-1Normal  -     losartan (COZAAR) 25 MG tablet; Take 1 tablet by mouth every morning, Disp-90 tablet, R-1Normal  I strongly encouraged him to check home readings 1-2 times weekly.  He never had the labs drawn that were ordered in March as he ended up canceling that appointment.  Will draw A1c, lipid panel, CBC, CMP, total and free PSA, HIV screening and microalbumin today.    6. Mixed hyperlipidemia  -     atorvastatin (LIPITOR) 10 MG tablet; Take 1 tablet by mouth nightly, Disp-90 tablet, R-3Normal  Patient reports taking medication daily as directed.  We are checking annual level with labs.    7. Insomnia, unspecified type  -     doxepin (SINEQUAN) 25 MG capsule; TAKE 1 TO 2 CAPSULES BY MOUTH EVERY NIGHT AT BEDTIME AS NEEDED FOR SLEEP, Disp-180 capsule, R-1Normal  Refill provided as requested.      Aspects of this note have been generated using voice recognition software. Despite editing, there may be some syntax errors.    Melissa Karimi MD

## 2024-07-16 LAB
ALBUMIN SERPL-MCNC: 4.4 G/DL (ref 3.9–4.9)
ALBUMIN/CREAT UR: 26 MG/G CREAT (ref 0–29)
ALP SERPL-CCNC: 77 IU/L (ref 44–121)
ALT SERPL-CCNC: 15 IU/L (ref 0–44)
AST SERPL-CCNC: 14 IU/L (ref 0–40)
BASOPHILS # BLD AUTO: 0 X10E3/UL (ref 0–0.2)
BASOPHILS NFR BLD AUTO: 1 %
BILIRUB SERPL-MCNC: 0.4 MG/DL (ref 0–1.2)
BUN SERPL-MCNC: 19 MG/DL (ref 8–27)
BUN/CREAT SERPL: 14 (ref 10–24)
CALCIUM SERPL-MCNC: 9.8 MG/DL (ref 8.6–10.2)
CHLORIDE SERPL-SCNC: 99 MMOL/L (ref 96–106)
CHOLEST SERPL-MCNC: 173 MG/DL (ref 100–199)
CO2 SERPL-SCNC: 24 MMOL/L (ref 20–29)
CREAT SERPL-MCNC: 1.33 MG/DL (ref 0.76–1.27)
CREAT UR-MCNC: 129.5 MG/DL
EGFRCR SERPLBLD CKD-EPI 2021: 60 ML/MIN/1.73
EOSINOPHIL # BLD AUTO: 0.1 X10E3/UL (ref 0–0.4)
EOSINOPHIL NFR BLD AUTO: 2 %
ERYTHROCYTE [DISTWIDTH] IN BLOOD BY AUTOMATED COUNT: 12.4 % (ref 11.6–15.4)
GLOBULIN SER CALC-MCNC: 2.6 G/DL (ref 1.5–4.5)
GLUCOSE SERPL-MCNC: 195 MG/DL (ref 70–99)
HBA1C MFR BLD: 7.7 % (ref 4.8–5.6)
HCT VFR BLD AUTO: 39.6 % (ref 37.5–51)
HDLC SERPL-MCNC: 53 MG/DL
HGB BLD-MCNC: 12.9 G/DL (ref 13–17.7)
HIV 1+2 AB+HIV1 P24 AG SERPL QL IA: NON REACTIVE
IMM GRANULOCYTES # BLD AUTO: 0 X10E3/UL (ref 0–0.1)
IMM GRANULOCYTES NFR BLD AUTO: 0 %
LDLC SERPL CALC-MCNC: 103 MG/DL (ref 0–99)
LYMPHOCYTES # BLD AUTO: 1.7 X10E3/UL (ref 0.7–3.1)
LYMPHOCYTES NFR BLD AUTO: 25 %
MCH RBC QN AUTO: 29.5 PG (ref 26.6–33)
MCHC RBC AUTO-ENTMCNC: 32.6 G/DL (ref 31.5–35.7)
MCV RBC AUTO: 91 FL (ref 79–97)
MICROALBUMIN UR-MCNC: 33.2 UG/ML
MONOCYTES # BLD AUTO: 0.6 X10E3/UL (ref 0.1–0.9)
MONOCYTES NFR BLD AUTO: 8 %
NEUTROPHILS # BLD AUTO: 4.4 X10E3/UL (ref 1.4–7)
NEUTROPHILS NFR BLD AUTO: 64 %
PLATELET # BLD AUTO: 318 X10E3/UL (ref 150–450)
POTASSIUM SERPL-SCNC: 4.3 MMOL/L (ref 3.5–5.2)
PROT SERPL-MCNC: 7 G/DL (ref 6–8.5)
PSA FREE MFR SERPL: 18 %
PSA FREE SERPL-MCNC: 0.81 NG/ML
PSA SERPL-MCNC: 4.5 NG/ML (ref 0–4)
RBC # BLD AUTO: 4.37 X10E6/UL (ref 4.14–5.8)
REFLEX CRITERIA: ABNORMAL
SODIUM SERPL-SCNC: 138 MMOL/L (ref 134–144)
TRIGL SERPL-MCNC: 95 MG/DL (ref 0–149)
VLDLC SERPL CALC-MCNC: 17 MG/DL (ref 5–40)
WBC # BLD AUTO: 6.8 X10E3/UL (ref 3.4–10.8)

## 2024-07-17 DIAGNOSIS — R97.20 ELEVATED PSA: Primary | ICD-10-CM

## 2024-07-17 RX ORDER — DULAGLUTIDE 3 MG/.5ML
3 INJECTION, SOLUTION SUBCUTANEOUS WEEKLY
Qty: 6 ML | Refills: 1 | Status: SHIPPED | OUTPATIENT
Start: 2024-07-17

## 2024-07-17 RX ORDER — ATORVASTATIN CALCIUM 20 MG/1
20 TABLET, FILM COATED ORAL EVERY EVENING
Qty: 90 TABLET | Refills: 1 | Status: SHIPPED | OUTPATIENT
Start: 2024-07-17

## 2024-08-04 ENCOUNTER — PATIENT MESSAGE (OUTPATIENT)
Facility: CLINIC | Age: 64
End: 2024-08-04

## 2024-08-04 DIAGNOSIS — R80.9 TYPE 2 DIABETES MELLITUS WITH MICROALBUMINURIA, WITHOUT LONG-TERM CURRENT USE OF INSULIN (HCC): Primary | ICD-10-CM

## 2024-08-04 DIAGNOSIS — E11.29 TYPE 2 DIABETES MELLITUS WITH MICROALBUMINURIA, WITHOUT LONG-TERM CURRENT USE OF INSULIN (HCC): Primary | ICD-10-CM

## 2024-08-05 NOTE — TELEPHONE ENCOUNTER
From: Marcell Markham  To: Dr. Melissa Karimi  Sent: 8/4/2024 12:23 PM EDT  Subject: Trulicity    The new trulicity 3mg Rx is backordered at [facility]. Today is my first week w/o.

## 2024-09-26 RX ORDER — ATORVASTATIN CALCIUM 10 MG/1
10 TABLET, FILM COATED ORAL NIGHTLY
Qty: 90 TABLET | Refills: 1 | OUTPATIENT
Start: 2024-09-26

## 2024-10-05 DIAGNOSIS — E11.29 TYPE 2 DIABETES MELLITUS WITH MICROALBUMINURIA, WITHOUT LONG-TERM CURRENT USE OF INSULIN (HCC): ICD-10-CM

## 2024-10-05 DIAGNOSIS — R80.9 TYPE 2 DIABETES MELLITUS WITH MICROALBUMINURIA, WITHOUT LONG-TERM CURRENT USE OF INSULIN (HCC): ICD-10-CM

## 2024-10-07 RX ORDER — METFORMIN HCL 500 MG
TABLET, EXTENDED RELEASE 24 HR ORAL
Qty: 270 TABLET | Refills: 1 | Status: SHIPPED | OUTPATIENT
Start: 2024-10-07

## 2025-01-20 ENCOUNTER — OFFICE VISIT (OUTPATIENT)
Facility: CLINIC | Age: 65
End: 2025-01-20
Payer: COMMERCIAL

## 2025-01-20 VITALS
OXYGEN SATURATION: 96 % | HEIGHT: 68 IN | TEMPERATURE: 97.4 F | WEIGHT: 157 LBS | SYSTOLIC BLOOD PRESSURE: 122 MMHG | DIASTOLIC BLOOD PRESSURE: 68 MMHG | BODY MASS INDEX: 23.79 KG/M2 | RESPIRATION RATE: 20 BRPM | HEART RATE: 87 BPM

## 2025-01-20 DIAGNOSIS — E78.2 MIXED HYPERLIPIDEMIA: ICD-10-CM

## 2025-01-20 DIAGNOSIS — R97.20 ELEVATED PSA: ICD-10-CM

## 2025-01-20 DIAGNOSIS — E11.29 TYPE 2 DIABETES MELLITUS WITH MICROALBUMINURIA, WITHOUT LONG-TERM CURRENT USE OF INSULIN (HCC): Primary | ICD-10-CM

## 2025-01-20 DIAGNOSIS — Z87.891 HISTORY OF SMOKELESS TOBACCO USE: ICD-10-CM

## 2025-01-20 DIAGNOSIS — R80.9 TYPE 2 DIABETES MELLITUS WITH MICROALBUMINURIA, WITHOUT LONG-TERM CURRENT USE OF INSULIN (HCC): Primary | ICD-10-CM

## 2025-01-20 PROCEDURE — 99214 OFFICE O/P EST MOD 30 MIN: CPT | Performed by: FAMILY MEDICINE

## 2025-01-20 PROCEDURE — 1123F ACP DISCUSS/DSCN MKR DOCD: CPT | Performed by: FAMILY MEDICINE

## 2025-01-20 SDOH — ECONOMIC STABILITY: FOOD INSECURITY: WITHIN THE PAST 12 MONTHS, THE FOOD YOU BOUGHT JUST DIDN'T LAST AND YOU DIDN'T HAVE MONEY TO GET MORE.: NEVER TRUE

## 2025-01-20 SDOH — ECONOMIC STABILITY: TRANSPORTATION INSECURITY
IN THE PAST 12 MONTHS, HAS LACK OF TRANSPORTATION KEPT YOU FROM MEETINGS, WORK, OR FROM GETTING THINGS NEEDED FOR DAILY LIVING?: NO

## 2025-01-20 SDOH — ECONOMIC STABILITY: FOOD INSECURITY: WITHIN THE PAST 12 MONTHS, YOU WORRIED THAT YOUR FOOD WOULD RUN OUT BEFORE YOU GOT MONEY TO BUY MORE.: NEVER TRUE

## 2025-01-20 SDOH — ECONOMIC STABILITY: TRANSPORTATION INSECURITY
IN THE PAST 12 MONTHS, HAS THE LACK OF TRANSPORTATION KEPT YOU FROM MEDICAL APPOINTMENTS OR FROM GETTING MEDICATIONS?: NO

## 2025-01-20 SDOH — ECONOMIC STABILITY: INCOME INSECURITY: IN THE LAST 12 MONTHS, WAS THERE A TIME WHEN YOU WERE NOT ABLE TO PAY THE MORTGAGE OR RENT ON TIME?: NO

## 2025-01-20 ASSESSMENT — PATIENT HEALTH QUESTIONNAIRE - PHQ9
SUM OF ALL RESPONSES TO PHQ QUESTIONS 1-9: 0
1. LITTLE INTEREST OR PLEASURE IN DOING THINGS: NOT AT ALL
2. FEELING DOWN, DEPRESSED OR HOPELESS: NOT AT ALL
SUM OF ALL RESPONSES TO PHQ QUESTIONS 1-9: 0
SUM OF ALL RESPONSES TO PHQ9 QUESTIONS 1 & 2: 0
SUM OF ALL RESPONSES TO PHQ QUESTIONS 1-9: 0
SUM OF ALL RESPONSES TO PHQ QUESTIONS 1-9: 0

## 2025-01-20 NOTE — PROGRESS NOTES
\"Have you been to the ER, urgent care clinic since your last visit?  Hospitalized since your last visit?\"    NO    “Have you seen or consulted any other health care providers outside our system since your last visit?”    NO  /68 (Site: Right Upper Arm, Position: Sitting, Cuff Size: Large Adult)   Pulse 87   Temp 97.4 °F (36.3 °C) (Temporal)   Resp 20   Ht 1.727 m (5' 8\")   Wt 71.2 kg (157 lb)   SpO2 96%   BMI 23.87 kg/m²   Chief Complaint   Patient presents with    Follow-up Chronic Condition     PHQ-9 Total Score: 0 (2025  7:33 AM)    [facility] Social Determinants Of Health (Sdoh) Screening Questionnaire       Question 2025  6:53 AM EST - Filed by Patient    Within the past 12 months, you worried that your food would run out before you got the money to buy more. Never true    Within the past 12 months, the food you bought just didn't last and you didn't have money to get more. Never true    In the past 12 months, has lack of transportation kept you from medical appointments or from getting medications? No    In the past 12 months, has lack of transportation kept you from meetings, work, or from getting things needed for daily living? No    In the last 12 months, was there a time when you were not able to pay the mortgage or rent on time? No    In the past 12 months, how many times have you moved where you were living? 0    At any time in the past 12 months, were you homeless or living in a shelter (including now)? No    In the past 12 months has the electric, gas, oil, or water company threatened to shut off services in your home? No    Would you like resources for any of these topics? No, thank you              “Have you had a diabetic eye exam?”    NO 2024 Dr Levi    Date of last diabetic eye exam: 10/18/2023       Click Here for Release of Records Request       Identified Patient with 2 Patient Identifiers-Name and

## 2025-01-20 NOTE — ASSESSMENT & PLAN NOTE
Uncontrolled on last check.  We increased his Trulicity to 3 mg weekly.  Rechecking level today.  Orders:    Hemoglobin A1C

## 2025-01-20 NOTE — ASSESSMENT & PLAN NOTE
Uncontrolled on last check.  We increased his atorvastatin to 20 mg daily.  Rechecking levels.  Fasting.  Orders:    Lipid Panel    Comprehensive Metabolic Panel

## 2025-01-20 NOTE — PROGRESS NOTES
Subjective  Chief Complaint   Patient presents with    Follow-up Chronic Condition     HPI:  Marcell Markham is a 64 y.o. male.    He is following up today on histories of diabetes and hyperlipidemia.  After his July labs we increased his Trulicity to the 3 mg dose and increased atorvastatin to 10 mg daily.  He reports taking both as directed.  Home blood sugar range 117-170.  He is fasting today for labs.  Additionally on his July labs there was an elevation in his PSA level.  He states he has not seen urology today but does intend to schedule.    Past Medical History:   Diagnosis Date    Basal cell carcinoma     Diabetes mellitus type 2, controlled (HCC)     GERD (gastroesophageal reflux disease)     Insomnia     Mixed hyperlipidemia     Obstructive sleep apnea      Current Outpatient Medications on File Prior to Visit   Medication Sig Dispense Refill    metFORMIN (GLUCOPHAGE-XR) 500 MG extended release tablet TAKE THREE TABLETS BY MOUTH DAILY 270 tablet 1    Dulaglutide (TRULICITY) 3 MG/0.5ML SOPN Inject 3 mg into the skin once a week 6 mL 1    atorvastatin (LIPITOR) 20 MG tablet Take 1 tablet by mouth every evening 90 tablet 1    losartan (COZAAR) 25 MG tablet Take 1 tablet by mouth every morning 90 tablet 1    doxepin (SINEQUAN) 25 MG capsule TAKE 1 TO 2 CAPSULES BY MOUTH EVERY NIGHT AT BEDTIME AS NEEDED FOR SLEEP 180 capsule 1    aspirin 81 MG EC tablet Take by mouth daily       No current facility-administered medications on file prior to visit.     Allergies   Allergen Reactions    Lisinopril Cough       Objective  Vitals:    01/20/25 0734   BP: 122/68   Pulse: 87   Resp: 20   Temp: 97.4 °F (36.3 °C)   SpO2: 96%     Physical Exam  Constitutional:       General: He is not in acute distress.     Appearance: Normal appearance. He is normal weight. He is not ill-appearing.   HENT:      Head: Normocephalic and atraumatic.   Neck:      Thyroid: No thyroid mass or thyromegaly.   Cardiovascular:      Rate and

## 2025-01-21 LAB
ALBUMIN SERPL-MCNC: 4.4 G/DL (ref 3.9–4.9)
ALP SERPL-CCNC: 90 IU/L (ref 44–121)
ALT SERPL-CCNC: 22 IU/L (ref 0–44)
AST SERPL-CCNC: 20 IU/L (ref 0–40)
BILIRUB SERPL-MCNC: <0.2 MG/DL (ref 0–1.2)
BUN SERPL-MCNC: 14 MG/DL (ref 8–27)
BUN/CREAT SERPL: 10 (ref 10–24)
CALCIUM SERPL-MCNC: 8.8 MG/DL (ref 8.6–10.2)
CHLORIDE SERPL-SCNC: 101 MMOL/L (ref 96–106)
CHOLEST SERPL-MCNC: 113 MG/DL (ref 100–199)
CO2 SERPL-SCNC: 26 MMOL/L (ref 20–29)
CREAT SERPL-MCNC: 1.35 MG/DL (ref 0.76–1.27)
EGFRCR SERPLBLD CKD-EPI 2021: 58 ML/MIN/1.73
GLOBULIN SER CALC-MCNC: 2.3 G/DL (ref 1.5–4.5)
GLUCOSE SERPL-MCNC: 151 MG/DL (ref 70–99)
HBA1C MFR BLD: 7.3 % (ref 4.8–5.6)
HDLC SERPL-MCNC: 43 MG/DL
LDLC SERPL CALC-MCNC: 51 MG/DL (ref 0–99)
POTASSIUM SERPL-SCNC: 5.5 MMOL/L (ref 3.5–5.2)
PROT SERPL-MCNC: 6.7 G/DL (ref 6–8.5)
PSA SERPL-MCNC: 3.6 NG/ML (ref 0–4)
REFLEX CRITERIA: NORMAL
SODIUM SERPL-SCNC: 139 MMOL/L (ref 134–144)
TRIGL SERPL-MCNC: 98 MG/DL (ref 0–149)
VLDLC SERPL CALC-MCNC: 19 MG/DL (ref 5–40)

## 2025-01-23 RX ORDER — ATORVASTATIN CALCIUM 20 MG/1
20 TABLET, FILM COATED ORAL NIGHTLY
Qty: 90 TABLET | Refills: 1 | Status: SHIPPED | OUTPATIENT
Start: 2025-01-23

## 2025-01-25 DIAGNOSIS — R80.9 TYPE 2 DIABETES MELLITUS WITH MICROALBUMINURIA, WITHOUT LONG-TERM CURRENT USE OF INSULIN (HCC): Primary | ICD-10-CM

## 2025-01-25 DIAGNOSIS — E11.29 TYPE 2 DIABETES MELLITUS WITH MICROALBUMINURIA, WITHOUT LONG-TERM CURRENT USE OF INSULIN (HCC): Primary | ICD-10-CM

## 2025-02-02 RX ORDER — DULAGLUTIDE 3 MG/.5ML
INJECTION, SOLUTION SUBCUTANEOUS
Qty: 2 ML | OUTPATIENT
Start: 2025-02-02

## 2025-03-03 ENCOUNTER — HOSPITAL ENCOUNTER (OUTPATIENT)
Facility: HOSPITAL | Age: 65
Discharge: HOME | End: 2025-03-06
Payer: COMMERCIAL

## 2025-03-03 DIAGNOSIS — Z87.891 HISTORY OF SMOKELESS TOBACCO USE: ICD-10-CM

## 2025-03-03 PROCEDURE — 76706 US ABDL AORTA SCREEN AAA: CPT

## 2025-03-06 DIAGNOSIS — E11.29 TYPE 2 DIABETES MELLITUS WITH MICROALBUMINURIA, WITHOUT LONG-TERM CURRENT USE OF INSULIN (HCC): ICD-10-CM

## 2025-03-06 DIAGNOSIS — R80.9 TYPE 2 DIABETES MELLITUS WITH MICROALBUMINURIA, WITHOUT LONG-TERM CURRENT USE OF INSULIN (HCC): ICD-10-CM

## 2025-03-06 RX ORDER — LOSARTAN POTASSIUM 25 MG/1
25 TABLET ORAL EVERY MORNING
Qty: 90 TABLET | Refills: 1 | Status: SHIPPED | OUTPATIENT
Start: 2025-03-06

## 2025-03-18 DIAGNOSIS — R80.9 TYPE 2 DIABETES MELLITUS WITH MICROALBUMINURIA, WITHOUT LONG-TERM CURRENT USE OF INSULIN (HCC): ICD-10-CM

## 2025-03-18 DIAGNOSIS — E11.29 TYPE 2 DIABETES MELLITUS WITH MICROALBUMINURIA, WITHOUT LONG-TERM CURRENT USE OF INSULIN (HCC): ICD-10-CM

## 2025-03-19 ENCOUNTER — PATIENT MESSAGE (OUTPATIENT)
Facility: CLINIC | Age: 65
End: 2025-03-19

## 2025-03-19 RX ORDER — DULAGLUTIDE 3 MG/.5ML
3 INJECTION, SOLUTION SUBCUTANEOUS WEEKLY
Qty: 2 ML | Refills: 0 | Status: SHIPPED | OUTPATIENT
Start: 2025-04-16

## 2025-03-23 ENCOUNTER — PATIENT MESSAGE (OUTPATIENT)
Facility: CLINIC | Age: 65
End: 2025-03-23

## 2025-03-23 DIAGNOSIS — R80.9 TYPE 2 DIABETES MELLITUS WITH MICROALBUMINURIA, WITHOUT LONG-TERM CURRENT USE OF INSULIN (HCC): ICD-10-CM

## 2025-03-23 DIAGNOSIS — E11.29 TYPE 2 DIABETES MELLITUS WITH MICROALBUMINURIA, WITHOUT LONG-TERM CURRENT USE OF INSULIN (HCC): ICD-10-CM

## 2025-03-25 RX ORDER — DULAGLUTIDE 3 MG/.5ML
3 INJECTION, SOLUTION SUBCUTANEOUS WEEKLY
Qty: 6 ML | Refills: 0 | Status: SHIPPED | OUTPATIENT
Start: 2025-04-16

## 2025-04-06 DIAGNOSIS — E11.29 TYPE 2 DIABETES MELLITUS WITH MICROALBUMINURIA, WITHOUT LONG-TERM CURRENT USE OF INSULIN (HCC): ICD-10-CM

## 2025-04-06 DIAGNOSIS — R80.9 TYPE 2 DIABETES MELLITUS WITH MICROALBUMINURIA, WITHOUT LONG-TERM CURRENT USE OF INSULIN (HCC): ICD-10-CM

## 2025-04-06 RX ORDER — METFORMIN HYDROCHLORIDE 500 MG/1
TABLET, EXTENDED RELEASE ORAL
Qty: 270 TABLET | Refills: 1 | Status: SHIPPED | OUTPATIENT
Start: 2025-04-06

## 2025-04-27 ENCOUNTER — PATIENT MESSAGE (OUTPATIENT)
Facility: CLINIC | Age: 65
End: 2025-04-27

## 2025-04-27 DIAGNOSIS — E11.29 TYPE 2 DIABETES MELLITUS WITH MICROALBUMINURIA, WITHOUT LONG-TERM CURRENT USE OF INSULIN (HCC): ICD-10-CM

## 2025-04-27 DIAGNOSIS — R80.9 TYPE 2 DIABETES MELLITUS WITH MICROALBUMINURIA, WITHOUT LONG-TERM CURRENT USE OF INSULIN (HCC): ICD-10-CM

## 2025-04-28 RX ORDER — DULAGLUTIDE 3 MG/.5ML
3 INJECTION, SOLUTION SUBCUTANEOUS WEEKLY
Qty: 6 ML | Refills: 0 | OUTPATIENT
Start: 2025-04-28

## 2025-04-28 RX ORDER — DULAGLUTIDE 3 MG/.5ML
3 INJECTION, SOLUTION SUBCUTANEOUS WEEKLY
Qty: 6 ML | Refills: 1 | Status: SHIPPED | OUTPATIENT
Start: 2025-04-28

## 2025-07-28 RX ORDER — ATORVASTATIN CALCIUM 20 MG/1
20 TABLET, FILM COATED ORAL DAILY
Qty: 90 TABLET | Refills: 2 | Status: SHIPPED | OUTPATIENT
Start: 2025-07-28

## 2025-08-08 ENCOUNTER — PATIENT MESSAGE (OUTPATIENT)
Facility: CLINIC | Age: 65
End: 2025-08-08

## 2025-08-21 ENCOUNTER — TELEMEDICINE (OUTPATIENT)
Facility: CLINIC | Age: 65
End: 2025-08-21
Payer: COMMERCIAL

## 2025-08-21 DIAGNOSIS — R80.9 TYPE 2 DIABETES MELLITUS WITH MICROALBUMINURIA, WITHOUT LONG-TERM CURRENT USE OF INSULIN (HCC): Primary | ICD-10-CM

## 2025-08-21 DIAGNOSIS — E11.29 TYPE 2 DIABETES MELLITUS WITH MICROALBUMINURIA, WITHOUT LONG-TERM CURRENT USE OF INSULIN (HCC): Primary | ICD-10-CM

## 2025-08-21 PROCEDURE — 99213 OFFICE O/P EST LOW 20 MIN: CPT | Performed by: FAMILY MEDICINE

## 2025-08-21 RX ORDER — DULAGLUTIDE 3 MG/.5ML
3 INJECTION, SOLUTION SUBCUTANEOUS WEEKLY
Qty: 6 ML | Refills: 1 | Status: SHIPPED | OUTPATIENT
Start: 2025-08-21

## 2025-08-21 RX ORDER — METFORMIN HYDROCHLORIDE 500 MG/1
1500 TABLET, EXTENDED RELEASE ORAL
Qty: 270 TABLET | Refills: 1 | Status: SHIPPED | OUTPATIENT
Start: 2025-08-21

## 2025-08-21 RX ORDER — TAMSULOSIN HYDROCHLORIDE 0.4 MG/1
0.4 CAPSULE ORAL DAILY
COMMUNITY
Start: 2025-08-02

## 2025-08-27 DIAGNOSIS — R80.9 TYPE 2 DIABETES MELLITUS WITH MICROALBUMINURIA, WITHOUT LONG-TERM CURRENT USE OF INSULIN (HCC): ICD-10-CM

## 2025-08-27 DIAGNOSIS — E11.29 TYPE 2 DIABETES MELLITUS WITH MICROALBUMINURIA, WITHOUT LONG-TERM CURRENT USE OF INSULIN (HCC): ICD-10-CM

## 2025-08-27 RX ORDER — LOSARTAN POTASSIUM 25 MG/1
25 TABLET ORAL EVERY MORNING
Qty: 90 TABLET | Refills: 1 | Status: SHIPPED | OUTPATIENT
Start: 2025-08-27

## 2025-09-03 ENCOUNTER — LAB (OUTPATIENT)
Facility: CLINIC | Age: 65
End: 2025-09-03

## 2025-09-04 LAB
ALBUMIN/CREAT UR: 23 MG/G CREAT (ref 0–29)
CREAT UR-MCNC: 254.8 MG/DL
EST. AVERAGE GLUCOSE BLD GHB EST-MCNC: 157 MG/DL
HBA1C MFR BLD: 7.1 % (ref 4.8–5.6)
MICROALBUMIN UR-MCNC: 58.6 UG/ML

## 2025-09-05 ENCOUNTER — RESULTS FOLLOW-UP (OUTPATIENT)
Facility: CLINIC | Age: 65
End: 2025-09-05